# Patient Record
Sex: MALE | Race: WHITE | NOT HISPANIC OR LATINO | Employment: OTHER | URBAN - METROPOLITAN AREA
[De-identification: names, ages, dates, MRNs, and addresses within clinical notes are randomized per-mention and may not be internally consistent; named-entity substitution may affect disease eponyms.]

---

## 2024-01-31 ENCOUNTER — OFFICE VISIT (OUTPATIENT)
Dept: PRIMARY CARE | Facility: CLINIC | Age: 59
End: 2024-01-31
Payer: COMMERCIAL

## 2024-01-31 VITALS
OXYGEN SATURATION: 96 % | DIASTOLIC BLOOD PRESSURE: 78 MMHG | HEART RATE: 77 BPM | WEIGHT: 200.6 LBS | SYSTOLIC BLOOD PRESSURE: 127 MMHG | BODY MASS INDEX: 30.95 KG/M2

## 2024-01-31 DIAGNOSIS — L42 PITYRIASIS ROSEA: Primary | ICD-10-CM

## 2024-01-31 PROCEDURE — 99213 OFFICE O/P EST LOW 20 MIN: CPT

## 2024-01-31 ASSESSMENT — ENCOUNTER SYMPTOMS
FEVER: 0
DIARRHEA: 0
VOMITING: 0
FATIGUE: 0
NAUSEA: 0
CHILLS: 0

## 2024-01-31 ASSESSMENT — PATIENT HEALTH QUESTIONNAIRE - PHQ9
SUM OF ALL RESPONSES TO PHQ9 QUESTIONS 1 AND 2: 0
1. LITTLE INTEREST OR PLEASURE IN DOING THINGS: NOT AT ALL
2. FEELING DOWN, DEPRESSED OR HOPELESS: NOT AT ALL

## 2024-01-31 ASSESSMENT — PAIN SCALES - GENERAL: PAINLEVEL: 0-NO PAIN

## 2024-01-31 NOTE — PROGRESS NOTES
Subjective   Patient ID: Levy Flores is a 58 y.o. male who presents for Rash.    Rash  This is a new problem. The current episode started 1 to 4 weeks ago (about two weeks. Endorse may have started with intial patch on torso). The problem has been gradually worsening since onset. The affected locations include the abdomen, right arm, right upper leg, left upper leg, left arm, torso and back. The rash is characterized by redness and itchiness. He was exposed to a new detergent/soap, a new medication and a new animal. Pertinent negatives include no diarrhea, fatigue, fever or vomiting. (Did have slight cold that has resolved and started after rash was already present) Past treatments include antibiotic cream. The treatment provided no relief.        Review of Systems   Constitutional:  Negative for chills, fatigue and fever.   Gastrointestinal:  Negative for diarrhea, nausea and vomiting.   Skin:  Positive for rash.       Objective   /78   Pulse 77   Wt 91 kg (200 lb 9.6 oz)   SpO2 96%   BMI 30.95 kg/m²     Physical Exam  Constitutional:       Appearance: Normal appearance.   Cardiovascular:      Rate and Rhythm: Normal rate.   Pulmonary:      Effort: Pulmonary effort is normal.   Skin:     Findings: Rash present. Rash is scaling.      Comments: Erythematous plaque and papules throughout torso upper legs, and arms with scale   Neurological:      Mental Status: He is alert.   Psychiatric:         Mood and Affect: Mood and affect normal.         Assessment/Plan   Diagnoses and all orders for this visit:  Pityriasis rosea  -Educated benign and will resolve without treatment. Can use anti-histamine topical or oral for itch as needed. Given information packet on rash.

## 2024-03-18 ENCOUNTER — LAB (OUTPATIENT)
Dept: LAB | Facility: LAB | Age: 59
End: 2024-03-18
Payer: COMMERCIAL

## 2024-03-18 ENCOUNTER — OFFICE VISIT (OUTPATIENT)
Dept: PRIMARY CARE | Facility: CLINIC | Age: 59
End: 2024-03-18
Payer: COMMERCIAL

## 2024-03-18 ENCOUNTER — HOSPITAL ENCOUNTER (OUTPATIENT)
Dept: RADIOLOGY | Facility: CLINIC | Age: 59
Discharge: HOME | End: 2024-03-18
Payer: COMMERCIAL

## 2024-03-18 VITALS
SYSTOLIC BLOOD PRESSURE: 136 MMHG | WEIGHT: 202 LBS | HEART RATE: 72 BPM | DIASTOLIC BLOOD PRESSURE: 80 MMHG | BODY MASS INDEX: 29.92 KG/M2 | OXYGEN SATURATION: 95 % | HEIGHT: 69 IN

## 2024-03-18 DIAGNOSIS — Z13.220 LIPID SCREENING: ICD-10-CM

## 2024-03-18 DIAGNOSIS — Z12.5 SCREENING PSA (PROSTATE SPECIFIC ANTIGEN): ICD-10-CM

## 2024-03-18 DIAGNOSIS — Z23 NEED FOR VACCINATION: ICD-10-CM

## 2024-03-18 DIAGNOSIS — Z00.00 ROUTINE GENERAL MEDICAL EXAMINATION AT A HEALTH CARE FACILITY: Primary | ICD-10-CM

## 2024-03-18 DIAGNOSIS — Z00.00 ROUTINE GENERAL MEDICAL EXAMINATION AT A HEALTH CARE FACILITY: ICD-10-CM

## 2024-03-18 DIAGNOSIS — Z13.1 SCREENING FOR DIABETES MELLITUS: ICD-10-CM

## 2024-03-18 DIAGNOSIS — M25.471 RIGHT ANKLE SWELLING: ICD-10-CM

## 2024-03-18 LAB
ALBUMIN SERPL BCP-MCNC: 4.4 G/DL (ref 3.4–5)
ALP SERPL-CCNC: 43 U/L (ref 33–120)
ALT SERPL W P-5'-P-CCNC: 21 U/L (ref 10–52)
ANION GAP SERPL CALC-SCNC: 13 MMOL/L (ref 10–20)
AST SERPL W P-5'-P-CCNC: 18 U/L (ref 9–39)
BILIRUB SERPL-MCNC: 0.8 MG/DL (ref 0–1.2)
BUN SERPL-MCNC: 23 MG/DL (ref 6–23)
CALCIUM SERPL-MCNC: 9.1 MG/DL (ref 8.6–10.6)
CHLORIDE SERPL-SCNC: 109 MMOL/L (ref 98–107)
CHOLEST SERPL-MCNC: 156 MG/DL (ref 0–199)
CHOLESTEROL/HDL RATIO: 3.9
CO2 SERPL-SCNC: 25 MMOL/L (ref 21–32)
CREAT SERPL-MCNC: 0.95 MG/DL (ref 0.5–1.3)
EGFRCR SERPLBLD CKD-EPI 2021: >90 ML/MIN/1.73M*2
GLUCOSE SERPL-MCNC: 96 MG/DL (ref 74–99)
HDLC SERPL-MCNC: 39.7 MG/DL
LDLC SERPL CALC-MCNC: 97 MG/DL
NON HDL CHOLESTEROL: 116 MG/DL (ref 0–149)
POC APPEARANCE, URINE: CLEAR
POC BILIRUBIN, URINE: NEGATIVE
POC BLOOD, URINE: NEGATIVE
POC COLOR, URINE: YELLOW
POC GLUCOSE, URINE: NEGATIVE MG/DL
POC KETONES, URINE: NEGATIVE MG/DL
POC LEUKOCYTES, URINE: NEGATIVE
POC NITRITE,URINE: NEGATIVE
POC PH, URINE: 5.5 PH
POC PROTEIN, URINE: NEGATIVE MG/DL
POC SPECIFIC GRAVITY, URINE: >=1.03
POC UROBILINOGEN, URINE: 0.2 EU/DL
POTASSIUM SERPL-SCNC: 4.5 MMOL/L (ref 3.5–5.3)
PROT SERPL-MCNC: 6.9 G/DL (ref 6.4–8.2)
PSA SERPL-MCNC: 0.97 NG/ML
SODIUM SERPL-SCNC: 142 MMOL/L (ref 136–145)
TRIGL SERPL-MCNC: 97 MG/DL (ref 0–149)
VLDL: 19 MG/DL (ref 0–40)

## 2024-03-18 PROCEDURE — 90750 HZV VACC RECOMBINANT IM: CPT | Performed by: FAMILY MEDICINE

## 2024-03-18 PROCEDURE — 80053 COMPREHEN METABOLIC PANEL: CPT

## 2024-03-18 PROCEDURE — 80061 LIPID PANEL: CPT

## 2024-03-18 PROCEDURE — 99396 PREV VISIT EST AGE 40-64: CPT | Performed by: FAMILY MEDICINE

## 2024-03-18 PROCEDURE — 36415 COLL VENOUS BLD VENIPUNCTURE: CPT

## 2024-03-18 PROCEDURE — 1036F TOBACCO NON-USER: CPT | Performed by: FAMILY MEDICINE

## 2024-03-18 PROCEDURE — 73610 X-RAY EXAM OF ANKLE: CPT | Mod: RT

## 2024-03-18 PROCEDURE — 84153 ASSAY OF PSA TOTAL: CPT

## 2024-03-18 PROCEDURE — 81003 URINALYSIS AUTO W/O SCOPE: CPT | Mod: QW | Performed by: FAMILY MEDICINE

## 2024-03-18 RX ORDER — IBUPROFEN 800 MG/1
800 TABLET ORAL EVERY 6 HOURS PRN
COMMUNITY
Start: 2024-03-13

## 2024-03-18 ASSESSMENT — PATIENT HEALTH QUESTIONNAIRE - PHQ9
SUM OF ALL RESPONSES TO PHQ9 QUESTIONS 1 AND 2: 0
2. FEELING DOWN, DEPRESSED OR HOPELESS: NOT AT ALL
1. LITTLE INTEREST OR PLEASURE IN DOING THINGS: NOT AT ALL

## 2024-03-18 ASSESSMENT — ENCOUNTER SYMPTOMS
CARDIOVASCULAR NEGATIVE: 1
PSYCHIATRIC NEGATIVE: 1
MUSCULOSKELETAL NEGATIVE: 1
RESPIRATORY NEGATIVE: 1
HEMATOLOGIC/LYMPHATIC NEGATIVE: 1
ENDOCRINE NEGATIVE: 1
CONSTITUTIONAL NEGATIVE: 1
NEUROLOGICAL NEGATIVE: 1

## 2024-03-18 ASSESSMENT — PAIN SCALES - GENERAL: PAINLEVEL: 0-NO PAIN

## 2024-03-18 NOTE — PROGRESS NOTES
[facility]: [facility]  PHYSICAL EXAM      Levy Flores is a 59 y.o. male that is presenting today for Annual Exam (Patient is concerned with acid reflux bothering him during the night/dd).     Subjective   58 yo CM    PT here for annual exam    He does have some issues as heartburn.      Walks mile per day, exercise -4 x per week.      Colon 2016.      Review of Systems   Constitutional: Negative.    HENT: Negative.     Respiratory: Negative.     Cardiovascular: Negative.    Endocrine: Negative.    Genitourinary: Negative.    Musculoskeletal: Negative.    Neurological: Negative.    Hematological: Negative.    Psychiatric/Behavioral: Negative.         History    History reviewed. No pertinent past medical history.  History reviewed. No pertinent surgical history.  Family History   Problem Relation Name Age of Onset    Cancer Mother       Allergies   Allergen Reactions    Morphine Nausea/vomiting     Nausea and vomiting     Current Outpatient Medications on File Prior to Visit   Medication Sig Dispense Refill    ibuprofen 800 mg tablet Take 1 tablet (800 mg) by mouth every 6 hours if needed.       No current facility-administered medications on file prior to visit.     Immunization History   Administered Date(s) Administered    Juvencio SARS-CoV-2 Vaccination 03/18/2021    Moderna SARS-CoV-2 Vaccination 11/29/2021     Patient's medical history was reviewed and updated either before or during this encounter.    Objective   Vitals:    03/18/24 0806   BP: 136/80   Pulse: 72   SpO2: 95%      Physical Exam  Constitutional:       General: He is not in acute distress.     Appearance: Normal appearance. He is not ill-appearing.   HENT:      Right Ear: Tympanic membrane, ear canal and external ear normal. There is no impacted cerumen.      Left Ear: Tympanic membrane, ear canal and external ear normal.      Nose: Nose normal.      Mouth/Throat:      Pharynx: Oropharynx is clear. No posterior  oropharyngeal erythema.   Neck:      Thyroid: No thyroid mass or thyroid tenderness.      Vascular: No carotid bruit.   Cardiovascular:      Rate and Rhythm: Normal rate and regular rhythm.      Pulses:           Radial pulses are 2+ on the right side and 2+ on the left side.        Dorsalis pedis pulses are 2+ on the right side and 2+ on the left side.      Heart sounds: Normal heart sounds. No murmur heard.     No friction rub. No gallop.   Pulmonary:      Effort: Pulmonary effort is normal.      Breath sounds: Normal breath sounds.   Abdominal:      General: Bowel sounds are normal.      Palpations: Abdomen is soft. There is no hepatomegaly or splenomegaly.      Tenderness: There is no abdominal tenderness.   Musculoskeletal:      Cervical back: Normal range of motion. No tenderness.      Right lower leg: No edema.      Left lower leg: No edema.      Comments: No gross abnormalities    Lymphadenopathy:      Cervical: No cervical adenopathy.      Upper Body:      Right upper body: No supraclavicular adenopathy.      Left upper body: No supraclavicular adenopathy.   Skin:     General: Skin is warm.      Capillary Refill: Capillary refill takes 2 to 3 seconds.   Neurological:      General: No focal deficit present.      Mental Status: He is alert.      Cranial Nerves: Cranial nerves 2-12 are intact.      Coordination: Coordination is intact.      Gait: Gait is intact.      Comments: No obvious neurological deficits    Psychiatric:         Mood and Affect: Mood and affect normal.           Assessment/Plan      There is no problem list on file for this patient.    Diagnoses and all orders for this visit:  Routine general medical examination at a health care facility  -     POCT UA Automated manually resulted  -     Comprehensive Metabolic Panel; Future  -     Lipid Panel; Future  -     Prostate Specific Antigen, Screen; Future  Lipid screening  -     Comprehensive Metabolic Panel; Future  -     Lipid Panel;  Future  Screening for diabetes mellitus  -     Comprehensive Metabolic Panel; Future  -     Lipid Panel; Future  Screening PSA (prostate specific antigen)  -     Prostate Specific Antigen, Screen; Future  Right ankle swelling  -     XR ankle right 3+ views; Future  Need for vaccination  Other orders  -     Zoster vaccine, recombinant, adult (SHINGRIX)  Shingles vaccines.    Elevate bed 2-6 inches for nocturnal GERD    The patient was encouraged to ensure that any/all documentation is accurate and up to date, and that our office be provided a copy in the event that anything changes.    Radames Peralta MD

## 2024-06-17 ENCOUNTER — HOSPITAL ENCOUNTER (EMERGENCY)
Facility: HOSPITAL | Age: 59
Discharge: HOME | End: 2024-06-17
Attending: STUDENT IN AN ORGANIZED HEALTH CARE EDUCATION/TRAINING PROGRAM
Payer: COMMERCIAL

## 2024-06-17 ENCOUNTER — APPOINTMENT (OUTPATIENT)
Dept: RADIOLOGY | Facility: HOSPITAL | Age: 59
End: 2024-06-17
Payer: COMMERCIAL

## 2024-06-17 VITALS
HEIGHT: 69 IN | RESPIRATION RATE: 15 BRPM | TEMPERATURE: 98.4 F | WEIGHT: 177.25 LBS | HEART RATE: 88 BPM | SYSTOLIC BLOOD PRESSURE: 135 MMHG | BODY MASS INDEX: 26.25 KG/M2 | DIASTOLIC BLOOD PRESSURE: 80 MMHG | OXYGEN SATURATION: 96 %

## 2024-06-17 DIAGNOSIS — R11.2 NAUSEA AND VOMITING, UNSPECIFIED VOMITING TYPE: Primary | ICD-10-CM

## 2024-06-17 DIAGNOSIS — I10 HYPERTENSION, UNSPECIFIED TYPE: ICD-10-CM

## 2024-06-17 DIAGNOSIS — R10.84 GENERALIZED ABDOMINAL PAIN: ICD-10-CM

## 2024-06-17 DIAGNOSIS — D72.829 LEUKOCYTOSIS, UNSPECIFIED TYPE: ICD-10-CM

## 2024-06-17 DIAGNOSIS — F19.10 POLYSUBSTANCE ABUSE (MULTI): ICD-10-CM

## 2024-06-17 LAB
ALBUMIN SERPL-MCNC: 4.8 G/DL (ref 3.5–5)
ALP BLD-CCNC: 59 U/L (ref 35–125)
ALT SERPL-CCNC: 58 U/L (ref 5–40)
AMPHETAMINES UR QL SCN>1000 NG/ML: NEGATIVE
ANION GAP SERPL CALC-SCNC: 16 MMOL/L
APPEARANCE UR: CLEAR
AST SERPL-CCNC: 56 U/L (ref 5–40)
BARBITURATES UR QL SCN>300 NG/ML: NEGATIVE
BASOPHILS # BLD AUTO: 0.09 X10*3/UL (ref 0–0.1)
BASOPHILS NFR BLD AUTO: 0.5 %
BENZODIAZ UR QL SCN>300 NG/ML: NEGATIVE
BILIRUB SERPL-MCNC: 1.4 MG/DL (ref 0.1–1.2)
BILIRUB UR STRIP.AUTO-MCNC: NEGATIVE MG/DL
BUN SERPL-MCNC: 33 MG/DL (ref 8–25)
BZE UR QL SCN>300 NG/ML: NEGATIVE
CALCIUM SERPL-MCNC: 9.9 MG/DL (ref 8.5–10.4)
CANNABINOIDS UR QL SCN>50 NG/ML: POSITIVE
CHLORIDE SERPL-SCNC: 99 MMOL/L (ref 97–107)
CO2 SERPL-SCNC: 20 MMOL/L (ref 24–31)
COLOR UR: YELLOW
CREAT SERPL-MCNC: 1.3 MG/DL (ref 0.4–1.6)
EGFRCR SERPLBLD CKD-EPI 2021: 63 ML/MIN/1.73M*2
EOSINOPHIL # BLD AUTO: 0.01 X10*3/UL (ref 0–0.7)
EOSINOPHIL NFR BLD AUTO: 0.1 %
ERYTHROCYTE [DISTWIDTH] IN BLOOD BY AUTOMATED COUNT: 12.4 % (ref 11.5–14.5)
FENTANYL+NORFENTANYL UR QL SCN: NEGATIVE
GLUCOSE SERPL-MCNC: 129 MG/DL (ref 65–99)
GLUCOSE UR STRIP.AUTO-MCNC: NORMAL MG/DL
HCT VFR BLD AUTO: 47.3 % (ref 41–52)
HGB BLD-MCNC: 16.2 G/DL (ref 13.5–17.5)
IMM GRANULOCYTES # BLD AUTO: 0.05 X10*3/UL (ref 0–0.7)
IMM GRANULOCYTES NFR BLD AUTO: 0.3 % (ref 0–0.9)
KETONES UR STRIP.AUTO-MCNC: NEGATIVE MG/DL
LACTATE BLDV-SCNC: 1.8 MMOL/L (ref 0.4–2)
LACTATE BLDV-SCNC: 2.2 MMOL/L (ref 0.4–2)
LEUKOCYTE ESTERASE UR QL STRIP.AUTO: NEGATIVE
LIPASE SERPL-CCNC: 27 U/L (ref 16–63)
LYMPHOCYTES # BLD AUTO: 2.55 X10*3/UL (ref 1.2–4.8)
LYMPHOCYTES NFR BLD AUTO: 14 %
MAGNESIUM SERPL-MCNC: 2 MG/DL (ref 1.6–3.1)
MCH RBC QN AUTO: 28.8 PG (ref 26–34)
MCHC RBC AUTO-ENTMCNC: 34.2 G/DL (ref 32–36)
MCV RBC AUTO: 84 FL (ref 80–100)
METHADONE UR QL SCN>300 NG/ML: NEGATIVE
MONOCYTES # BLD AUTO: 1.43 X10*3/UL (ref 0.1–1)
MONOCYTES NFR BLD AUTO: 7.9 %
MUCOUS THREADS #/AREA URNS AUTO: NORMAL /LPF
NEUTROPHILS # BLD AUTO: 14.07 X10*3/UL (ref 1.2–7.7)
NEUTROPHILS NFR BLD AUTO: 77.2 %
NITRITE UR QL STRIP.AUTO: NEGATIVE
NRBC BLD-RTO: 0 /100 WBCS (ref 0–0)
OPIATES UR QL SCN>300 NG/ML: POSITIVE
OXYCODONE UR QL: NEGATIVE
PCP UR QL SCN>25 NG/ML: NEGATIVE
PH UR STRIP.AUTO: 6 [PH]
PLATELET # BLD AUTO: 337 X10*3/UL (ref 150–450)
POTASSIUM SERPL-SCNC: 4.1 MMOL/L (ref 3.4–5.1)
PROT SERPL-MCNC: 8.3 G/DL (ref 5.9–7.9)
PROT UR STRIP.AUTO-MCNC: ABNORMAL MG/DL
RBC # BLD AUTO: 5.63 X10*6/UL (ref 4.5–5.9)
RBC # UR STRIP.AUTO: ABNORMAL /UL
RBC #/AREA URNS AUTO: NORMAL /HPF
SODIUM SERPL-SCNC: 135 MMOL/L (ref 133–145)
SP GR UR STRIP.AUTO: 1.03
UROBILINOGEN UR STRIP.AUTO-MCNC: NORMAL MG/DL
WBC # BLD AUTO: 18.2 X10*3/UL (ref 4.4–11.3)
WBC #/AREA URNS AUTO: NORMAL /HPF

## 2024-06-17 PROCEDURE — 74177 CT ABD & PELVIS W/CONTRAST: CPT | Performed by: RADIOLOGY

## 2024-06-17 PROCEDURE — 2500000004 HC RX 250 GENERAL PHARMACY W/ HCPCS (ALT 636 FOR OP/ED): Performed by: STUDENT IN AN ORGANIZED HEALTH CARE EDUCATION/TRAINING PROGRAM

## 2024-06-17 PROCEDURE — 83605 ASSAY OF LACTIC ACID: CPT | Performed by: PHYSICIAN ASSISTANT

## 2024-06-17 PROCEDURE — 2500000002 HC RX 250 W HCPCS SELF ADMINISTERED DRUGS (ALT 637 FOR MEDICARE OP, ALT 636 FOR OP/ED): Performed by: PHYSICIAN ASSISTANT

## 2024-06-17 PROCEDURE — 83690 ASSAY OF LIPASE: CPT | Performed by: PHYSICIAN ASSISTANT

## 2024-06-17 PROCEDURE — 36415 COLL VENOUS BLD VENIPUNCTURE: CPT | Performed by: PHYSICIAN ASSISTANT

## 2024-06-17 PROCEDURE — 2550000001 HC RX 255 CONTRASTS: Performed by: STUDENT IN AN ORGANIZED HEALTH CARE EDUCATION/TRAINING PROGRAM

## 2024-06-17 PROCEDURE — 81001 URINALYSIS AUTO W/SCOPE: CPT | Mod: 59 | Performed by: PHYSICIAN ASSISTANT

## 2024-06-17 PROCEDURE — 80307 DRUG TEST PRSMV CHEM ANLYZR: CPT | Performed by: PHYSICIAN ASSISTANT

## 2024-06-17 PROCEDURE — 96361 HYDRATE IV INFUSION ADD-ON: CPT

## 2024-06-17 PROCEDURE — 99284 EMERGENCY DEPT VISIT MOD MDM: CPT

## 2024-06-17 PROCEDURE — 80053 COMPREHEN METABOLIC PANEL: CPT | Performed by: PHYSICIAN ASSISTANT

## 2024-06-17 PROCEDURE — 87040 BLOOD CULTURE FOR BACTERIA: CPT | Mod: 91,TRILAB | Performed by: PHYSICIAN ASSISTANT

## 2024-06-17 PROCEDURE — 2500000002 HC RX 250 W HCPCS SELF ADMINISTERED DRUGS (ALT 637 FOR MEDICARE OP, ALT 636 FOR OP/ED): Performed by: STUDENT IN AN ORGANIZED HEALTH CARE EDUCATION/TRAINING PROGRAM

## 2024-06-17 PROCEDURE — 96374 THER/PROPH/DIAG INJ IV PUSH: CPT | Mod: 59

## 2024-06-17 PROCEDURE — 83735 ASSAY OF MAGNESIUM: CPT | Performed by: PHYSICIAN ASSISTANT

## 2024-06-17 PROCEDURE — 85025 COMPLETE CBC W/AUTO DIFF WBC: CPT | Performed by: PHYSICIAN ASSISTANT

## 2024-06-17 PROCEDURE — 74177 CT ABD & PELVIS W/CONTRAST: CPT

## 2024-06-17 PROCEDURE — 96375 TX/PRO/DX INJ NEW DRUG ADDON: CPT

## 2024-06-17 PROCEDURE — 2500000004 HC RX 250 GENERAL PHARMACY W/ HCPCS (ALT 636 FOR OP/ED): Performed by: PHYSICIAN ASSISTANT

## 2024-06-17 RX ORDER — PROMETHAZINE HYDROCHLORIDE 25 MG/1
25 TABLET ORAL EVERY 6 HOURS PRN
Qty: 12 TABLET | Refills: 0 | Status: SHIPPED | OUTPATIENT
Start: 2024-06-17 | End: 2024-06-20

## 2024-06-17 RX ORDER — HALOPERIDOL 5 MG/ML
5 INJECTION INTRAMUSCULAR ONCE
Status: COMPLETED | OUTPATIENT
Start: 2024-06-17 | End: 2024-06-17

## 2024-06-17 RX ORDER — PROMETHAZINE HYDROCHLORIDE 25 MG/1
25 TABLET ORAL ONCE
Status: COMPLETED | OUTPATIENT
Start: 2024-06-17 | End: 2024-06-17

## 2024-06-17 RX ORDER — SUCRALFATE 1 G/10ML
1 SUSPENSION ORAL ONCE
Status: COMPLETED | OUTPATIENT
Start: 2024-06-17 | End: 2024-06-17

## 2024-06-17 RX ORDER — ONDANSETRON 4 MG/1
2 TABLET, ORALLY DISINTEGRATING ORAL EVERY 8 HOURS PRN
Qty: 15 TABLET | Refills: 0 | Status: SHIPPED | OUTPATIENT
Start: 2024-06-17 | End: 2024-06-17 | Stop reason: WASHOUT

## 2024-06-17 RX ORDER — SUCRALFATE 1 G/1
1 TABLET ORAL
Qty: 28 TABLET | Refills: 0 | Status: SHIPPED | OUTPATIENT
Start: 2024-06-17 | End: 2024-06-24

## 2024-06-17 RX ORDER — ONDANSETRON HYDROCHLORIDE 2 MG/ML
4 INJECTION, SOLUTION INTRAVENOUS ONCE
Status: COMPLETED | OUTPATIENT
Start: 2024-06-17 | End: 2024-06-17

## 2024-06-17 RX ORDER — FAMOTIDINE 10 MG/ML
20 INJECTION INTRAVENOUS ONCE
Status: COMPLETED | OUTPATIENT
Start: 2024-06-17 | End: 2024-06-17

## 2024-06-17 RX ORDER — DICYCLOMINE HYDROCHLORIDE 20 MG/1
20 TABLET ORAL 2 TIMES DAILY
Qty: 20 TABLET | Refills: 0 | Status: SHIPPED | OUTPATIENT
Start: 2024-06-17 | End: 2024-06-27

## 2024-06-17 ASSESSMENT — PAIN - FUNCTIONAL ASSESSMENT: PAIN_FUNCTIONAL_ASSESSMENT: 0-10

## 2024-06-17 ASSESSMENT — COLUMBIA-SUICIDE SEVERITY RATING SCALE - C-SSRS
2. HAVE YOU ACTUALLY HAD ANY THOUGHTS OF KILLING YOURSELF?: NO
6. HAVE YOU EVER DONE ANYTHING, STARTED TO DO ANYTHING, OR PREPARED TO DO ANYTHING TO END YOUR LIFE?: NO
1. IN THE PAST MONTH, HAVE YOU WISHED YOU WERE DEAD OR WISHED YOU COULD GO TO SLEEP AND NOT WAKE UP?: NO

## 2024-06-17 ASSESSMENT — PAIN SCALES - GENERAL: PAINLEVEL_OUTOF10: 8

## 2024-06-17 NOTE — ED PROVIDER NOTES
HPI   Chief Complaint   Patient presents with    Vomiting     Since Saturday I have been vomiting I have taken 15 hot baths because it makes me feel better       HPI  The patient 59-year-old male here for nausea and vomiting, has had issues on and off for quite some time with this, states that hot baths make him feel better.  He has indicated that he has been seen for this in the past but usually once or twice a year he has issues, he has indicated that this particular episode is been going on since Saturday.                  Manny Coma Scale Score: 15                     Patient History   No past medical history on file.  No past surgical history on file.  Family History   Problem Relation Name Age of Onset    Cancer Mother       Social History     Tobacco Use    Smoking status: Never    Smokeless tobacco: Never   Substance Use Topics    Alcohol use: Never    Drug use: Never       Physical Exam   ED Triage Vitals [06/17/24 1721]   Temperature Heart Rate Respirations BP   36.9 °C (98.4 °F) 85 18 145/80      Pulse Ox Temp Source Heart Rate Source Patient Position   98 % Oral Monitor --      BP Location FiO2 (%)     -- --       Physical Exam  PHYSICAL EXAMINATION    GENERAL APPEARANCE: Awake and alert.     VITAL SIGNS: As per the nurses' triage record.     HEENT: Normocephalic, atraumatic. Extraocular muscles are intact. Pupils equal round and reactive to light. Conjunctiva are pink. Negative scleral icterus. Mucous membranes are moist. Tongue in the midline. Pharynx was without erythema or exudates, uvula midline    NECK: Soft Nontender and supple, full gross ROM, no meningeal signs.    CHEST: Nontender to palpation. Clear to auscultation bilaterally. No rales, rhonchi, or wheezing.     HEART: S1, S2. Regular rate and rhythm. No murmurs, gallops or rubs.  Strong and equal pulses in the extremities.     ABDOMEN: Soft, generalized tenderness diffusely., nondistended, positive bowel sounds, no palpable  masses.    MUSCULOSKELETAL: The calves are nontender to palpation. Full gross active range of motion. Ambulating on own with no acute difficulties     NEUROLOGICAL: Awake, alert and oriented x 3. Power intact in the upper and lower extremities. Sensation is intact to light touch in the upper and lower extremities.     IMMUNOLOGICAL: No lymphatic streaking noted     DERM: No petechiae, rashes, or ecchymoses.  ED Course & MDM   Diagnoses as of 06/17/24 2122   Nausea and vomiting, unspecified vomiting type   Generalized abdominal pain   Leukocytosis, unspecified type   Polysubstance abuse (Multi)   Hypertension, unspecified type       Medical Decision Making  Parts of this chart have been completed using voice recognition software. Please excuse any errors of transcription.  My thought process and reason for plan has been formulated from the time that I saw the patient until the time of disposition and is not specific to one specific moment during their visit and furthermore my MDM encompasses this entire chart and not only this text box.      HPI: Detailed above.    Exam: A medically appropriate exam performed, outlined above, given the known history and presentation.    History Limited by: Nothing    History obtained from: The patient    External/internal records reviewed: No external records reviewed    Social Determinants of Health considered during this visit: Lives at home    Chronic conditions impacting care: Similar episodes in the past    Medications given during visit:  Medications   sodium chloride 0.9 % bolus 1,000 mL (0 mL intravenous Stopped 6/17/24 1808)   haloperidol lactate (Haldol) injection 5 mg (5 mg intravenous Given 6/17/24 1753)   sodium chloride 0.9 % bolus 1,000 mL (0 mL intravenous Stopped 6/17/24 1848)   iohexol (OMNIPaque) 350 mg iodine/mL solution 75 mL (75 mL intravenous Given 6/17/24 1904)   ondansetron (Zofran) injection 4 mg (4 mg intravenous Given 6/17/24 1915)   famotidine PF (Pepcid)  injection 20 mg (20 mg intravenous Given 6/17/24 2031)   sodium chloride 0.9 % bolus 1,000 mL (1,000 mL intravenous New Bag 6/17/24 2031)   sucralfate (Carafate) suspension 1 g (1 g oral Given 6/17/24 2031)   promethazine (Phenergan) tablet 25 mg (25 mg oral Given 6/17/24 2112)        Diagnostic/tests  Labs Reviewed   CBC WITH AUTO DIFFERENTIAL - Abnormal       Result Value    WBC 18.2 (*)     nRBC 0.0      RBC 5.63      Hemoglobin 16.2      Hematocrit 47.3      MCV 84      MCH 28.8      MCHC 34.2      RDW 12.4      Platelets 337      Neutrophils % 77.2      Immature Granulocytes %, Automated 0.3      Lymphocytes % 14.0      Monocytes % 7.9      Eosinophils % 0.1      Basophils % 0.5      Neutrophils Absolute 14.07 (*)     Immature Granulocytes Absolute, Automated 0.05      Lymphocytes Absolute 2.55      Monocytes Absolute 1.43 (*)     Eosinophils Absolute 0.01      Basophils Absolute 0.09     COMPREHENSIVE METABOLIC PANEL - Abnormal    Glucose 129 (*)     Sodium 135      Potassium 4.1      Chloride 99      Bicarbonate 20 (*)     Urea Nitrogen 33 (*)     Creatinine 1.30      eGFR 63      Calcium 9.9      Albumin 4.8      Alkaline Phosphatase 59      Total Protein 8.3 (*)     AST 56 (*)     Bilirubin, Total 1.4 (*)     ALT 58 (*)     Anion Gap 16     URINALYSIS WITH REFLEX CULTURE AND MICROSCOPIC - Abnormal    Color, Urine Yellow      Appearance, Urine Clear      Specific Gravity, Urine 1.029      pH, Urine 6.0      Protein, Urine 50 (1+) (*)     Glucose, Urine Normal      Blood, Urine 0.1 (1+) (*)     Ketones, Urine NEGATIVE      Bilirubin, Urine NEGATIVE      Urobilinogen, Urine Normal      Nitrite, Urine NEGATIVE      Leukocyte Esterase, Urine NEGATIVE     DRUG SCREEN,URINE - Abnormal    Amphetamine Screen, Urine Negative      Barbiturate Screen, Urine Negative      Benzodiazepines Screen, Urine Negative      Cannabinoid Screen, Urine Positive (*)     Cocaine Metabolite Screen, Urine Negative      Fentanyl Screen,  Urine Negative      Methadone Screen, Urine Negative      Opiate Screen, Urine Positive (*)     Oxycodone Screen, Urine Negative      PCP Screen, Urine Negative      Narrative:     These toxicological screening tests provide unconfirmed qualitative measurements to aid in treatment and diagnosis in cases of drug use or overdose. This test is used only for medical purposes. A positive result does not indicate or measure intoxication. For specific test performance or pathologist consultation, please contact the Laboratory.    The following threshold concentrations are used for these analyses.Values at or above the threshold concentration are reported as positive. Values below the threshold are reported as negative.    Drug /Screening Threshold                                                                                                 THC/CANNABINOIDS................50 ng/ml  METHADONE......................300 ng/ml  COCAINE METABOLITES............300 ng/ml  BENZODIAZEPINE.................300 ng/ml  PCP.............................25 ng/ml  OPIATE.........................300 ng/ml  AMPHETAMINE/ECSTASY...........1000 ng/ml  BARBITURATE....................200 ng/ml  OXYCODONE......................100 ng/ml  FENTANYL.........................5 ng/ml       BLOOD GAS LACTIC ACID, VENOUS - Abnormal    POCT Lactate, Venous 2.2 (*)    LIPASE - Normal    Lipase 27     MAGNESIUM - Normal    Magnesium 2.00     BLOOD GAS LACTIC ACID, VENOUS - Normal    POCT Lactate, Venous 1.8     BLOOD CULTURE   BLOOD CULTURE   URINALYSIS WITH REFLEX CULTURE AND MICROSCOPIC    Narrative:     The following orders were created for panel order Urinalysis with Reflex Culture and Microscopic.  Procedure                               Abnormality         Status                     ---------                               -----------         ------                     Urinalysis with Reflex C...[[id number]]  Abnormal            Final result                Extra Urine Gray Tube[[id number]]                                                         Please view results for these tests on the individual orders.   EXTRA URINE GRAY TUBE   URINALYSIS MICROSCOPIC WITH REFLEX CULTURE    WBC, Urine 1-5      RBC, Urine 1-2      Mucus, Urine 1+        CT abdomen pelvis w IV contrast   Final Result   No acute abdominal or pelvic process.                  MACRO:   None        Signed by: Yue Todd 6/17/2024 8:06 PM   Dictation workstation:   [id number]           Case discussed with: Tending physician    Prescription medications considered: Symptomatic management will be given with Carafate, Bentyl, Zofran    Considerations/further MDM:   I estimate there is low risk for acute abdomen.      I have considered the following: acute appendicitis, bowel obstruction, cholecystitis, diverticulitis, incarcerated hernia, mesenteric ischemia, pancreatitis, acute liver failure, renal failure, UTI/Pyelonephritis to an extent that requires admission and IV abx, perforated bowel, or ulcers.        Thus I consider the discharge disposition reasonable. Also, there is no evidence or peritonitis, sepsis, or toxicity. We have discussed the diagnosis and risks, and we agree with discharging home to follow-up with appropriate physician as directed. We also discussed returning to the Emergency Department immediately if new or worsening symptoms occur. We have discussed the symptoms which are most concerning (e.g., bloody stool, fever, changing or worsening pain, nausea, vomiting) that necessitate immediate return.     Pt symptoms have been moderately well controlled here with medications and the patient is lower risk for acute/surgical abdomen and is safe for discharge with appropriate outpatient follow up.  The patient has verbalized understanding to return to ER without delay for new or worsening pains or for any other symptoms or concerns.    Recommend also follow-up to PCP for reassessment of  blood pressure as it was slightly elevated during ER visit.  Also recommended discontinuation of any recreational drug use  Procedure  Procedures     Al De La Fuente PA-C  06/17/24 4535

## 2024-06-18 NOTE — DISCHARGE INSTRUCTIONS
Be sure to follow up as directed in 1-2 days.  All of the details of your follow up instructions are detailed in the follow up section of this packet.     I recommend that you discontinue any and all recreational drug use as it may be contributing to your symptoms, follow-up on an outpatient basis he may return back to the emergency department anytime should you develop any different worsening pains or symptoms      It is important to remember that your care does not end here and you must continue to monitor your condition closely. Please return to the emergency department for any worsening or concerning signs or symptoms as directed by our conversations and the discharge instructions. Otherwise please follow up with your doctor in 2 days if no better or worse. If you do not have a doctor please contact the referral number on your discharge instructions. Please contact any physician specialists provided in your discharge notes as it is very important to follow up with them regarding your condition. If you are unable to reach the physicians provided, please come back to the Emergency Department at any time.        Return to emergency room without delay for ANY new or worsening pains or for any other symptoms or concerns.

## 2024-06-21 LAB
BACTERIA BLD CULT: NORMAL
BACTERIA BLD CULT: NORMAL

## 2024-07-19 ENCOUNTER — LAB (OUTPATIENT)
Dept: LAB | Facility: LAB | Age: 59
End: 2024-07-19
Payer: COMMERCIAL

## 2024-07-19 DIAGNOSIS — R11.2 NAUSEA WITH VOMITING, UNSPECIFIED: Primary | ICD-10-CM

## 2024-07-19 PROCEDURE — 84443 ASSAY THYROID STIM HORMONE: CPT

## 2024-07-19 PROCEDURE — 36415 COLL VENOUS BLD VENIPUNCTURE: CPT

## 2024-07-20 LAB — TSH SERPL-ACNC: 1.07 MIU/L (ref 0.44–3.98)

## 2024-07-22 ENCOUNTER — LAB (OUTPATIENT)
Dept: LAB | Facility: LAB | Age: 59
End: 2024-07-22
Payer: COMMERCIAL

## 2024-07-22 DIAGNOSIS — R94.5 ABNORMAL RESULTS OF LIVER FUNCTION STUDIES: Primary | ICD-10-CM

## 2024-07-22 LAB
ALBUMIN SERPL BCP-MCNC: 4.3 G/DL (ref 3.4–5)
ALP SERPL-CCNC: 39 U/L (ref 33–120)
ALT SERPL W P-5'-P-CCNC: 44 U/L (ref 10–52)
AST SERPL W P-5'-P-CCNC: 21 U/L (ref 9–39)
BILIRUB DIRECT SERPL-MCNC: 0.2 MG/DL (ref 0–0.3)
BILIRUB SERPL-MCNC: 1.2 MG/DL (ref 0–1.2)
HBV SURFACE AB SER-ACNC: <3.1 MIU/ML
HBV SURFACE AG SERPL QL IA: NONREACTIVE
HCV AB SER QL: NONREACTIVE
PROT SERPL-MCNC: 6.9 G/DL (ref 6.4–8.2)

## 2024-07-22 PROCEDURE — 80076 HEPATIC FUNCTION PANEL: CPT

## 2024-07-22 PROCEDURE — 86706 HEP B SURFACE ANTIBODY: CPT

## 2024-07-22 PROCEDURE — 86803 HEPATITIS C AB TEST: CPT

## 2024-07-22 PROCEDURE — 87340 HEPATITIS B SURFACE AG IA: CPT

## 2024-07-22 PROCEDURE — 36415 COLL VENOUS BLD VENIPUNCTURE: CPT

## 2024-07-24 DIAGNOSIS — R94.5 ABNORMAL RESULTS OF LIVER FUNCTION STUDIES: Primary | ICD-10-CM

## 2024-08-05 ENCOUNTER — OFFICE VISIT (OUTPATIENT)
Dept: PRIMARY CARE | Facility: CLINIC | Age: 59
End: 2024-08-05
Payer: COMMERCIAL

## 2024-08-05 DIAGNOSIS — Z23 NEED FOR VACCINATION: Primary | ICD-10-CM

## 2024-08-05 PROCEDURE — 90750 HZV VACC RECOMBINANT IM: CPT | Performed by: FAMILY MEDICINE

## 2024-08-05 PROCEDURE — 90471 IMMUNIZATION ADMIN: CPT | Performed by: FAMILY MEDICINE

## 2025-02-10 ENCOUNTER — APPOINTMENT (OUTPATIENT)
Dept: RADIOLOGY | Facility: HOSPITAL | Age: 60
End: 2025-02-10
Payer: COMMERCIAL

## 2025-02-10 ENCOUNTER — HOSPITAL ENCOUNTER (EMERGENCY)
Facility: HOSPITAL | Age: 60
Discharge: HOME | End: 2025-02-10
Attending: STUDENT IN AN ORGANIZED HEALTH CARE EDUCATION/TRAINING PROGRAM
Payer: COMMERCIAL

## 2025-02-10 ENCOUNTER — APPOINTMENT (OUTPATIENT)
Dept: CARDIOLOGY | Facility: HOSPITAL | Age: 60
End: 2025-02-10
Payer: COMMERCIAL

## 2025-02-10 VITALS
RESPIRATION RATE: 18 BRPM | SYSTOLIC BLOOD PRESSURE: 165 MMHG | BODY MASS INDEX: 29.14 KG/M2 | WEIGHT: 192.24 LBS | DIASTOLIC BLOOD PRESSURE: 84 MMHG | HEIGHT: 68 IN | TEMPERATURE: 98.1 F | OXYGEN SATURATION: 98 % | HEART RATE: 77 BPM

## 2025-02-10 DIAGNOSIS — R79.89 LFTS ABNORMAL: ICD-10-CM

## 2025-02-10 DIAGNOSIS — R11.2 NAUSEA AND VOMITING, UNSPECIFIED VOMITING TYPE: ICD-10-CM

## 2025-02-10 DIAGNOSIS — R10.84 GENERALIZED ABDOMINAL PAIN: Primary | ICD-10-CM

## 2025-02-10 DIAGNOSIS — R79.89 ELEVATED TROPONIN: ICD-10-CM

## 2025-02-10 LAB
ALBUMIN SERPL BCP-MCNC: 5.1 G/DL (ref 3.4–5)
ALP SERPL-CCNC: 44 U/L (ref 33–120)
ALT SERPL W P-5'-P-CCNC: 71 U/L (ref 10–52)
ANION GAP SERPL CALCULATED.3IONS-SCNC: 15 MMOL/L (ref 10–20)
AST SERPL W P-5'-P-CCNC: 151 U/L (ref 9–39)
ATRIAL RATE: 100 BPM
BASOPHILS # BLD AUTO: 0.04 X10*3/UL (ref 0–0.1)
BASOPHILS NFR BLD AUTO: 0.2 %
BILIRUB SERPL-MCNC: 2.5 MG/DL (ref 0–1.2)
BUN SERPL-MCNC: 33 MG/DL (ref 6–23)
CALCIUM SERPL-MCNC: 9.5 MG/DL (ref 8.6–10.3)
CARDIAC TROPONIN I PNL SERPL HS: 31 NG/L (ref 0–20)
CARDIAC TROPONIN I PNL SERPL HS: 38 NG/L (ref 0–20)
CHLORIDE SERPL-SCNC: 100 MMOL/L (ref 98–107)
CO2 SERPL-SCNC: 25 MMOL/L (ref 21–32)
CREAT SERPL-MCNC: 1.32 MG/DL (ref 0.5–1.3)
EGFRCR SERPLBLD CKD-EPI 2021: 62 ML/MIN/1.73M*2
EOSINOPHIL # BLD AUTO: 0.02 X10*3/UL (ref 0–0.7)
EOSINOPHIL NFR BLD AUTO: 0.1 %
ERYTHROCYTE [DISTWIDTH] IN BLOOD BY AUTOMATED COUNT: 12.4 % (ref 11.5–14.5)
FLUAV RNA RESP QL NAA+PROBE: NOT DETECTED
FLUBV RNA RESP QL NAA+PROBE: NOT DETECTED
GLUCOSE SERPL-MCNC: 129 MG/DL (ref 74–99)
HCT VFR BLD AUTO: 47.1 % (ref 41–52)
HGB BLD-MCNC: 16.2 G/DL (ref 13.5–17.5)
IMM GRANULOCYTES # BLD AUTO: 0.08 X10*3/UL (ref 0–0.7)
IMM GRANULOCYTES NFR BLD AUTO: 0.5 % (ref 0–0.9)
LACTATE SERPL-SCNC: 1.2 MMOL/L (ref 0.4–2)
LIPASE SERPL-CCNC: 24 U/L (ref 9–82)
LYMPHOCYTES # BLD AUTO: 2.5 X10*3/UL (ref 1.2–4.8)
LYMPHOCYTES NFR BLD AUTO: 14.2 %
MCH RBC QN AUTO: 29.1 PG (ref 26–34)
MCHC RBC AUTO-ENTMCNC: 34.4 G/DL (ref 32–36)
MCV RBC AUTO: 85 FL (ref 80–100)
MONOCYTES # BLD AUTO: 1.61 X10*3/UL (ref 0.1–1)
MONOCYTES NFR BLD AUTO: 9.2 %
NEUTROPHILS # BLD AUTO: 13.31 X10*3/UL (ref 1.2–7.7)
NEUTROPHILS NFR BLD AUTO: 75.8 %
NRBC BLD-RTO: 0 /100 WBCS (ref 0–0)
P AXIS: 57 DEGREES
P OFFSET: 207 MS
P ONSET: 154 MS
PLATELET # BLD AUTO: 334 X10*3/UL (ref 150–450)
POTASSIUM SERPL-SCNC: 3.8 MMOL/L (ref 3.5–5.3)
PR INTERVAL: 140 MS
PROT SERPL-MCNC: 8.5 G/DL (ref 6.4–8.2)
Q ONSET: 224 MS
QRS COUNT: 16 BEATS
QRS DURATION: 84 MS
QT INTERVAL: 336 MS
QTC CALCULATION(BAZETT): 433 MS
QTC FREDERICIA: 398 MS
R AXIS: -4 DEGREES
RBC # BLD AUTO: 5.57 X10*6/UL (ref 4.5–5.9)
SARS-COV-2 RNA RESP QL NAA+PROBE: NOT DETECTED
SODIUM SERPL-SCNC: 136 MMOL/L (ref 136–145)
T AXIS: 48 DEGREES
T OFFSET: 392 MS
VENTRICULAR RATE: 100 BPM
WBC # BLD AUTO: 17.6 X10*3/UL (ref 4.4–11.3)

## 2025-02-10 PROCEDURE — 2500000005 HC RX 250 GENERAL PHARMACY W/O HCPCS: Performed by: STUDENT IN AN ORGANIZED HEALTH CARE EDUCATION/TRAINING PROGRAM

## 2025-02-10 PROCEDURE — 36415 COLL VENOUS BLD VENIPUNCTURE: CPT | Performed by: PHYSICIAN ASSISTANT

## 2025-02-10 PROCEDURE — 87040 BLOOD CULTURE FOR BACTERIA: CPT | Mod: TRILAB | Performed by: STUDENT IN AN ORGANIZED HEALTH CARE EDUCATION/TRAINING PROGRAM

## 2025-02-10 PROCEDURE — 2550000001 HC RX 255 CONTRASTS: Performed by: STUDENT IN AN ORGANIZED HEALTH CARE EDUCATION/TRAINING PROGRAM

## 2025-02-10 PROCEDURE — 96374 THER/PROPH/DIAG INJ IV PUSH: CPT

## 2025-02-10 PROCEDURE — 71046 X-RAY EXAM CHEST 2 VIEWS: CPT

## 2025-02-10 PROCEDURE — 80053 COMPREHEN METABOLIC PANEL: CPT | Performed by: STUDENT IN AN ORGANIZED HEALTH CARE EDUCATION/TRAINING PROGRAM

## 2025-02-10 PROCEDURE — 85025 COMPLETE CBC W/AUTO DIFF WBC: CPT | Performed by: PHYSICIAN ASSISTANT

## 2025-02-10 PROCEDURE — 93005 ELECTROCARDIOGRAM TRACING: CPT

## 2025-02-10 PROCEDURE — 36415 COLL VENOUS BLD VENIPUNCTURE: CPT | Performed by: STUDENT IN AN ORGANIZED HEALTH CARE EDUCATION/TRAINING PROGRAM

## 2025-02-10 PROCEDURE — 83605 ASSAY OF LACTIC ACID: CPT | Performed by: STUDENT IN AN ORGANIZED HEALTH CARE EDUCATION/TRAINING PROGRAM

## 2025-02-10 PROCEDURE — 84484 ASSAY OF TROPONIN QUANT: CPT | Performed by: STUDENT IN AN ORGANIZED HEALTH CARE EDUCATION/TRAINING PROGRAM

## 2025-02-10 PROCEDURE — 85025 COMPLETE CBC W/AUTO DIFF WBC: CPT | Performed by: STUDENT IN AN ORGANIZED HEALTH CARE EDUCATION/TRAINING PROGRAM

## 2025-02-10 PROCEDURE — 96375 TX/PRO/DX INJ NEW DRUG ADDON: CPT

## 2025-02-10 PROCEDURE — 2500000004 HC RX 250 GENERAL PHARMACY W/ HCPCS (ALT 636 FOR OP/ED): Performed by: PHYSICIAN ASSISTANT

## 2025-02-10 PROCEDURE — 99285 EMERGENCY DEPT VISIT HI MDM: CPT | Mod: 25 | Performed by: STUDENT IN AN ORGANIZED HEALTH CARE EDUCATION/TRAINING PROGRAM

## 2025-02-10 PROCEDURE — 71046 X-RAY EXAM CHEST 2 VIEWS: CPT | Mod: FOREIGN READ | Performed by: RADIOLOGY

## 2025-02-10 PROCEDURE — 87636 SARSCOV2 & INF A&B AMP PRB: CPT | Performed by: PHYSICIAN ASSISTANT

## 2025-02-10 PROCEDURE — 83690 ASSAY OF LIPASE: CPT | Performed by: STUDENT IN AN ORGANIZED HEALTH CARE EDUCATION/TRAINING PROGRAM

## 2025-02-10 PROCEDURE — 2500000001 HC RX 250 WO HCPCS SELF ADMINISTERED DRUGS (ALT 637 FOR MEDICARE OP): Performed by: STUDENT IN AN ORGANIZED HEALTH CARE EDUCATION/TRAINING PROGRAM

## 2025-02-10 PROCEDURE — 74177 CT ABD & PELVIS W/CONTRAST: CPT | Mod: FOREIGN READ | Performed by: RADIOLOGY

## 2025-02-10 PROCEDURE — 74177 CT ABD & PELVIS W/CONTRAST: CPT

## 2025-02-10 PROCEDURE — 96361 HYDRATE IV INFUSION ADD-ON: CPT

## 2025-02-10 RX ORDER — ALUMINUM HYDROXIDE, MAGNESIUM HYDROXIDE, AND SIMETHICONE 1200; 120; 1200 MG/30ML; MG/30ML; MG/30ML
30 SUSPENSION ORAL ONCE
Status: COMPLETED | OUTPATIENT
Start: 2025-02-10 | End: 2025-02-10

## 2025-02-10 RX ORDER — ONDANSETRON HYDROCHLORIDE 2 MG/ML
4 INJECTION, SOLUTION INTRAVENOUS ONCE
Status: COMPLETED | OUTPATIENT
Start: 2025-02-10 | End: 2025-02-10

## 2025-02-10 RX ORDER — ONDANSETRON 4 MG/1
4 TABLET, ORALLY DISINTEGRATING ORAL EVERY 8 HOURS PRN
Qty: 20 TABLET | Refills: 0 | Status: SHIPPED | OUTPATIENT
Start: 2025-02-10 | End: 2025-02-17

## 2025-02-10 RX ORDER — HALOPERIDOL LACTATE 5 MG/ML
2 INJECTION, SOLUTION INTRAMUSCULAR ONCE
Status: COMPLETED | OUTPATIENT
Start: 2025-02-10 | End: 2025-02-10

## 2025-02-10 RX ORDER — LIDOCAINE HYDROCHLORIDE 20 MG/ML
15 SOLUTION OROPHARYNGEAL ONCE
Status: COMPLETED | OUTPATIENT
Start: 2025-02-10 | End: 2025-02-10

## 2025-02-10 RX ORDER — FAMOTIDINE 20 MG/1
20 TABLET, FILM COATED ORAL 2 TIMES DAILY
Qty: 30 TABLET | Refills: 0 | Status: SHIPPED | OUTPATIENT
Start: 2025-02-10 | End: 2025-02-11 | Stop reason: ALTCHOICE

## 2025-02-10 RX ADMIN — LIDOCAINE HYDROCHLORIDE 15 ML: 20 SOLUTION ORAL at 18:00

## 2025-02-10 RX ADMIN — ALUMINUM HYDROXIDE, MAGNESIUM HYDROXIDE, AND SIMETHICONE 30 ML: 200; 200; 20 SUSPENSION ORAL at 18:00

## 2025-02-10 RX ADMIN — ONDANSETRON 4 MG: 2 INJECTION, SOLUTION INTRAMUSCULAR; INTRAVENOUS at 14:58

## 2025-02-10 RX ADMIN — IOHEXOL 75 ML: 350 INJECTION, SOLUTION INTRAVENOUS at 16:50

## 2025-02-10 RX ADMIN — SODIUM CHLORIDE 500 ML: 900 INJECTION, SOLUTION INTRAVENOUS at 14:58

## 2025-02-10 RX ADMIN — HALOPERIDOL LACTATE 2 MG: 5 INJECTION, SOLUTION INTRAMUSCULAR at 16:02

## 2025-02-10 RX ADMIN — HYDROMORPHONE HYDROCHLORIDE 0.5 MG: 1 INJECTION, SOLUTION INTRAMUSCULAR; INTRAVENOUS; SUBCUTANEOUS at 14:58

## 2025-02-10 ASSESSMENT — PAIN DESCRIPTION - ORIENTATION: ORIENTATION: MID

## 2025-02-10 ASSESSMENT — PAIN DESCRIPTION - DESCRIPTORS
DESCRIPTORS: BURNING
DESCRIPTORS: BURNING

## 2025-02-10 ASSESSMENT — PAIN SCALES - GENERAL
PAINLEVEL_OUTOF10: 2
PAINLEVEL_OUTOF10: 7

## 2025-02-10 ASSESSMENT — COLUMBIA-SUICIDE SEVERITY RATING SCALE - C-SSRS
2. HAVE YOU ACTUALLY HAD ANY THOUGHTS OF KILLING YOURSELF?: NO
1. IN THE PAST MONTH, HAVE YOU WISHED YOU WERE DEAD OR WISHED YOU COULD GO TO SLEEP AND NOT WAKE UP?: NO
6. HAVE YOU EVER DONE ANYTHING, STARTED TO DO ANYTHING, OR PREPARED TO DO ANYTHING TO END YOUR LIFE?: NO

## 2025-02-10 ASSESSMENT — PAIN DESCRIPTION - FREQUENCY: FREQUENCY: CONSTANT/CONTINUOUS

## 2025-02-10 ASSESSMENT — PAIN DESCRIPTION - LOCATION: LOCATION: ABDOMEN

## 2025-02-10 ASSESSMENT — PAIN - FUNCTIONAL ASSESSMENT
PAIN_FUNCTIONAL_ASSESSMENT: 0-10
PAIN_FUNCTIONAL_ASSESSMENT: 0-10

## 2025-02-10 NOTE — ED NOTES
"Pt notified of need for UA and CT Scan at this time.   Pt states he is unable to give urine and states \"Im just too sick\". Pt refused straight cath at this time.     Per CT Tech pt refusing scan. Will continue to monitor.      Emely Kaur RN  02/10/25 [id number]    "

## 2025-02-11 ENCOUNTER — HOSPITAL ENCOUNTER (EMERGENCY)
Facility: HOSPITAL | Age: 60
Discharge: HOME | End: 2025-02-11
Attending: STUDENT IN AN ORGANIZED HEALTH CARE EDUCATION/TRAINING PROGRAM
Payer: COMMERCIAL

## 2025-02-11 VITALS
BODY MASS INDEX: 28.47 KG/M2 | DIASTOLIC BLOOD PRESSURE: 82 MMHG | SYSTOLIC BLOOD PRESSURE: 127 MMHG | TEMPERATURE: 98.6 F | HEART RATE: 82 BPM | RESPIRATION RATE: 17 BRPM | HEIGHT: 68 IN | OXYGEN SATURATION: 97 % | WEIGHT: 187.83 LBS

## 2025-02-11 DIAGNOSIS — R11.2 NAUSEA AND VOMITING, UNSPECIFIED VOMITING TYPE: ICD-10-CM

## 2025-02-11 DIAGNOSIS — R10.13 EPIGASTRIC PAIN: Primary | ICD-10-CM

## 2025-02-11 LAB
ALBUMIN SERPL BCP-MCNC: 4.8 G/DL (ref 3.4–5)
ALP SERPL-CCNC: 42 U/L (ref 33–120)
ALT SERPL W P-5'-P-CCNC: 92 U/L (ref 10–52)
ANION GAP SERPL CALCULATED.3IONS-SCNC: 11 MMOL/L (ref 10–20)
AST SERPL W P-5'-P-CCNC: 140 U/L (ref 9–39)
BASOPHILS # BLD AUTO: 0.05 X10*3/UL (ref 0–0.1)
BASOPHILS NFR BLD AUTO: 0.3 %
BILIRUB DIRECT SERPL-MCNC: 0.3 MG/DL (ref 0–0.3)
BILIRUB SERPL-MCNC: 2.2 MG/DL (ref 0–1.2)
BUN SERPL-MCNC: 27 MG/DL (ref 6–23)
CALCIUM SERPL-MCNC: 9.3 MG/DL (ref 8.6–10.3)
CHLORIDE SERPL-SCNC: 101 MMOL/L (ref 98–107)
CO2 SERPL-SCNC: 26 MMOL/L (ref 21–32)
CREAT SERPL-MCNC: 1.18 MG/DL (ref 0.5–1.3)
EGFRCR SERPLBLD CKD-EPI 2021: 71 ML/MIN/1.73M*2
EOSINOPHIL # BLD AUTO: 0.01 X10*3/UL (ref 0–0.7)
EOSINOPHIL NFR BLD AUTO: 0.1 %
ERYTHROCYTE [DISTWIDTH] IN BLOOD BY AUTOMATED COUNT: 12.2 % (ref 11.5–14.5)
GLUCOSE SERPL-MCNC: 128 MG/DL (ref 74–99)
HCT VFR BLD AUTO: 44.9 % (ref 41–52)
HGB BLD-MCNC: 15.7 G/DL (ref 13.5–17.5)
IMM GRANULOCYTES # BLD AUTO: 0.11 X10*3/UL (ref 0–0.7)
IMM GRANULOCYTES NFR BLD AUTO: 0.6 % (ref 0–0.9)
LACTATE SERPL-SCNC: 0.9 MMOL/L (ref 0.4–2)
LIPASE SERPL-CCNC: 29 U/L (ref 9–82)
LYMPHOCYTES # BLD AUTO: 2.38 X10*3/UL (ref 1.2–4.8)
LYMPHOCYTES NFR BLD AUTO: 13.3 %
MCH RBC QN AUTO: 29.4 PG (ref 26–34)
MCHC RBC AUTO-ENTMCNC: 35 G/DL (ref 32–36)
MCV RBC AUTO: 84 FL (ref 80–100)
MONOCYTES # BLD AUTO: 2.02 X10*3/UL (ref 0.1–1)
MONOCYTES NFR BLD AUTO: 11.3 %
NEUTROPHILS # BLD AUTO: 13.3 X10*3/UL (ref 1.2–7.7)
NEUTROPHILS NFR BLD AUTO: 74.4 %
NRBC BLD-RTO: 0 /100 WBCS (ref 0–0)
PLATELET # BLD AUTO: 330 X10*3/UL (ref 150–450)
POTASSIUM SERPL-SCNC: 3.7 MMOL/L (ref 3.5–5.3)
PROT SERPL-MCNC: 7.8 G/DL (ref 6.4–8.2)
RBC # BLD AUTO: 5.34 X10*6/UL (ref 4.5–5.9)
SODIUM SERPL-SCNC: 134 MMOL/L (ref 136–145)
WBC # BLD AUTO: 17.9 X10*3/UL (ref 4.4–11.3)

## 2025-02-11 PROCEDURE — 2500000002 HC RX 250 W HCPCS SELF ADMINISTERED DRUGS (ALT 637 FOR MEDICARE OP, ALT 636 FOR OP/ED): Performed by: STUDENT IN AN ORGANIZED HEALTH CARE EDUCATION/TRAINING PROGRAM

## 2025-02-11 PROCEDURE — 2500000004 HC RX 250 GENERAL PHARMACY W/ HCPCS (ALT 636 FOR OP/ED): Performed by: STUDENT IN AN ORGANIZED HEALTH CARE EDUCATION/TRAINING PROGRAM

## 2025-02-11 PROCEDURE — 80053 COMPREHEN METABOLIC PANEL: CPT | Performed by: STUDENT IN AN ORGANIZED HEALTH CARE EDUCATION/TRAINING PROGRAM

## 2025-02-11 PROCEDURE — 83605 ASSAY OF LACTIC ACID: CPT | Performed by: STUDENT IN AN ORGANIZED HEALTH CARE EDUCATION/TRAINING PROGRAM

## 2025-02-11 PROCEDURE — 96375 TX/PRO/DX INJ NEW DRUG ADDON: CPT

## 2025-02-11 PROCEDURE — 82248 BILIRUBIN DIRECT: CPT | Performed by: STUDENT IN AN ORGANIZED HEALTH CARE EDUCATION/TRAINING PROGRAM

## 2025-02-11 PROCEDURE — 99284 EMERGENCY DEPT VISIT MOD MDM: CPT | Mod: 25 | Performed by: STUDENT IN AN ORGANIZED HEALTH CARE EDUCATION/TRAINING PROGRAM

## 2025-02-11 PROCEDURE — 85025 COMPLETE CBC W/AUTO DIFF WBC: CPT | Performed by: STUDENT IN AN ORGANIZED HEALTH CARE EDUCATION/TRAINING PROGRAM

## 2025-02-11 PROCEDURE — 36415 COLL VENOUS BLD VENIPUNCTURE: CPT | Performed by: STUDENT IN AN ORGANIZED HEALTH CARE EDUCATION/TRAINING PROGRAM

## 2025-02-11 PROCEDURE — 83690 ASSAY OF LIPASE: CPT | Performed by: STUDENT IN AN ORGANIZED HEALTH CARE EDUCATION/TRAINING PROGRAM

## 2025-02-11 PROCEDURE — 96361 HYDRATE IV INFUSION ADD-ON: CPT

## 2025-02-11 PROCEDURE — 96374 THER/PROPH/DIAG INJ IV PUSH: CPT

## 2025-02-11 RX ORDER — SUCRALFATE 1 G/10ML
1 SUSPENSION ORAL 4 TIMES DAILY PRN
Qty: 1200 ML | Refills: 0 | Status: SHIPPED | OUTPATIENT
Start: 2025-02-11 | End: 2025-03-13

## 2025-02-11 RX ORDER — HALOPERIDOL 5 MG/ML
2 INJECTION INTRAMUSCULAR ONCE
Status: COMPLETED | OUTPATIENT
Start: 2025-02-11 | End: 2025-02-11

## 2025-02-11 RX ORDER — FAMOTIDINE 10 MG/ML
20 INJECTION INTRAVENOUS ONCE
Status: COMPLETED | OUTPATIENT
Start: 2025-02-11 | End: 2025-02-11

## 2025-02-11 RX ORDER — OMEPRAZOLE 20 MG/1
20 CAPSULE, DELAYED RELEASE ORAL DAILY
Qty: 30 CAPSULE | Refills: 0 | Status: SHIPPED | OUTPATIENT
Start: 2025-02-11 | End: 2025-03-13

## 2025-02-11 RX ORDER — SUCRALFATE 1 G/10ML
1 SUSPENSION ORAL ONCE
Status: COMPLETED | OUTPATIENT
Start: 2025-02-11 | End: 2025-02-11

## 2025-02-11 RX ORDER — ONDANSETRON HYDROCHLORIDE 2 MG/ML
4 INJECTION, SOLUTION INTRAVENOUS ONCE
Status: COMPLETED | OUTPATIENT
Start: 2025-02-11 | End: 2025-02-11

## 2025-02-11 RX ADMIN — ONDANSETRON 4 MG: 2 INJECTION, SOLUTION INTRAMUSCULAR; INTRAVENOUS at 07:30

## 2025-02-11 RX ADMIN — SODIUM CHLORIDE 1000 ML: 900 INJECTION, SOLUTION INTRAVENOUS at 07:30

## 2025-02-11 RX ADMIN — FAMOTIDINE 20 MG: 10 INJECTION, SOLUTION INTRAVENOUS at 07:30

## 2025-02-11 RX ADMIN — HALOPERIDOL LACTATE 2 MG: 5 INJECTION, SOLUTION INTRAMUSCULAR at 08:10

## 2025-02-11 RX ADMIN — SUCRALFATE 1 G: 1 SUSPENSION ORAL at 08:12

## 2025-02-11 ASSESSMENT — COLUMBIA-SUICIDE SEVERITY RATING SCALE - C-SSRS
6. HAVE YOU EVER DONE ANYTHING, STARTED TO DO ANYTHING, OR PREPARED TO DO ANYTHING TO END YOUR LIFE?: NO
1. IN THE PAST MONTH, HAVE YOU WISHED YOU WERE DEAD OR WISHED YOU COULD GO TO SLEEP AND NOT WAKE UP?: NO
2. HAVE YOU ACTUALLY HAD ANY THOUGHTS OF KILLING YOURSELF?: NO

## 2025-02-11 ASSESSMENT — PAIN SCALES - GENERAL: PAINLEVEL_OUTOF10: 0 - NO PAIN

## 2025-02-11 ASSESSMENT — PAIN - FUNCTIONAL ASSESSMENT: PAIN_FUNCTIONAL_ASSESSMENT: 0-10

## 2025-02-11 NOTE — ED PROVIDER NOTES
HPI   Chief Complaint   Patient presents with    Abdominal Pain     N/v and diarrhea since Saturday morning. 7/10 burning pain in the mid abdomen, unable to hold fluids down. Fever of 101 on Saturday. A-febrile currently.        59-year-old male presented emergency department with a chief complaint of nausea, vomiting, generalized abdominal discomfort.  He is a marijuana user.  He states he will have episodes like this every so often but always has elevated white blood cell count with nothing objectively abnormal.  He is requesting an antiemetic.  He denies chest pain, shortness of breath, dysuria diarrhea.  Well-appearing nontoxic afebrile.  Denies fall injury or trauma.  Denies illicit substance use.  No other complaint.              Patient History   No past medical history on file.  No past surgical history on file.  Family History   Problem Relation Name Age of Onset    Cancer Mother       Social History     Tobacco Use    Smoking status: Never    Smokeless tobacco: Never   Substance Use Topics    Alcohol use: Never    Drug use: Never       Physical Exam   ED Triage Vitals [02/10/25 1356]   Temperature Heart Rate Respirations BP   36.7 °C (98.1 °F) (!) 107 16 (!) 177/112      Pulse Ox Temp Source Heart Rate Source Patient Position   96 % Temporal Monitor Sitting      BP Location FiO2 (%)     Right arm --       Physical Exam  Vitals and nursing note reviewed.   Constitutional:       Appearance: He is well-developed.   HENT:      Head: Normocephalic and atraumatic.   Cardiovascular:      Rate and Rhythm: Normal rate and regular rhythm.   Pulmonary:      Effort: Pulmonary effort is normal.      Breath sounds: Normal breath sounds.   Abdominal:      Comments: Diffuse abdominal discomfort   Skin:     General: Skin is warm.   Neurological:      General: No focal deficit present.      Mental Status: He is alert and oriented to person, place, and time.   Psychiatric:         Mood and Affect: Mood normal.           ED  Course & MDM   ED Course as of 02/10/25 1958   Mon Feb 10, 2025   1757 EKG on my interpretation shows normal sinus rhythm, rate of 100 beats minute.  Left axis deviation.  QTc 433 ms, AR interval 140.  No ST elevation or depression, no acute ischemic pattern.  No STEMI.  Poor R wave progression, nonspecific EKG. [NT]      ED Course User Index  [NT] Deniz Nguyen DO         Diagnoses as of 02/10/25 1958   Generalized abdominal pain   Nausea and vomiting, unspecified vomiting type   LFTs abnormal   Elevated troponin                 No data recorded     [place] Coma Scale Score: 15 (02/10/25 1443 : Emely Kaur RN)                           Medical Decision Making  I have seen and evaluated this patient.  The attending physician has also seen and evaluated this patient.  Vital signs, laboratory testing and diagnostic images if applicable have been reviewed.  All laboratory and imaging is interpreted by myself unless otherwise stated.  Radiology studies are also formally interpreted by radiologist.     Patient with 70,000 leukocytosis likely reactive.  No significant anemia metabolic panel without sheela renal impairment or electrolyte abnormality, patient with elevation of bilirubin and transaminitis.  He states he is at this with previous exacerbations of acute nausea vomiting.  His troponin was 38 down trended to 31.  Otherwise acutely nonischemic EKG with patient has no chest pain or shortness of breath.  He feels markedly improved in the emergency department after medication.  He will be discharged for outpatient primary follow-up in addition to gastroenterology follow-up for recheck of transaminases to ensure downtrend.  Released in stable condition from emergent standpoint with antiemetic.    Labs Reviewed  CBC WITH AUTO DIFFERENTIAL - Abnormal     WBC                           17.6 (*)               nRBC                          0.0                    RBC                           5.57                    Hemoglobin                    16.2                   Hematocrit                    47.1                   MCV                           85                     MCH                           29.1                   MCHC                          34.4                   RDW                           12.4                   Platelets                     334                    Neutrophils %                 75.8                   Immature Granulocytes %, Automated   0.5                    Lymphocytes %                 14.2                   Monocytes %                   9.2                    Eosinophils %                 0.1                    Basophils %                   0.2                    Neutrophils Absolute          13.31 (*)               Immature Granulocytes Absolute, Au*   0.08                   Lymphocytes Absolute          2.50                   Monocytes Absolute            1.61 (*)               Eosinophils Absolute          0.02                   Basophils Absolute            0.04                COMPREHENSIVE METABOLIC PANEL - Abnormal     Glucose                       129 (*)                Sodium                        136                    Potassium                     3.8                    Chloride                      100                    Bicarbonate                   25                     Anion Gap                     15                     Urea Nitrogen                 33 (*)                 Creatinine                    1.32 (*)               eGFR                          62                     Calcium                       9.5                    Albumin                       5.1 (*)                Alkaline Phosphatase          44                     Total Protein                 8.5 (*)                AST                           151 (*)                Bilirubin, Total              2.5 (*)                ALT                           71 (*)              TROPONIN I, HIGH SENSITIVITY -  Abnormal     Troponin I, High Sensitivity   38 (*)                   Narrative: Less than 99th percentile of normal range cutoff-                Female and children under 18 years old <14 ng/L; Male <21 ng/L: Negative                Repeat testing should be performed if clinically indicated.                                 Female and children under 18 years old 14-50 ng/L; Male 21-50 ng/L:                Consistent with possible cardiac damage and possible increased clinical                 risk. Serial measurements may help to assess extent of myocardial damage.                                 >50 ng/L: Consistent with cardiac damage, increased clinical risk and                myocardial infarction. Serial measurements may help assess extent of                 myocardial damage.                                  NOTE: Children less than 1 year old may have higher baseline troponin                 levels and results should be interpreted in conjunction with the overall                 clinical context.                                 NOTE: Troponin I testing is performed using a different                 testing methodology at [facility] than at other                 [facility]. Direct result comparisons should only                 be made within the same method.  TROPONIN I, HIGH SENSITIVITY - Abnormal     Troponin I, High Sensitivity   31 (*)                   Narrative: Less than 99th percentile of normal range cutoff-                Female and children under 18 years old <14 ng/L; Male <21 ng/L: Negative                Repeat testing should be performed if clinically indicated.                                 Female and children under 18 years old 14-50 ng/L; Male 21-50 ng/L:                Consistent with possible cardiac damage and possible increased clinical                 risk. Serial measurements may help to assess extent of myocardial damage.                                 >50 ng/L:  Consistent with cardiac damage, increased clinical risk and                myocardial infarction. Serial measurements may help assess extent of                 myocardial damage.                                  NOTE: Children less than 1 year old may have higher baseline troponin                 levels and results should be interpreted in conjunction with the overall                 clinical context.                                 NOTE: Troponin I testing is performed using a different                 testing methodology at [facility] than at other                 [facility]. Direct result comparisons should only                 be made within the same method.  LIPASE - Normal     Lipase                        24                       Narrative: Venipuncture immediately after or during the administration of Metamizole may lead to falsely low results. Testing should be performed immediately prior to Metamizole dosing.  SARS-COV-2 AND INFLUENZA A/B PCR - Normal     Flu A Result                                         Flu B Result                                         Coronavirus 2019, PCR                                  Narrative: This assay is an FDA-cleared, in vitro diagnostic nucleic acid amplification test for the qualitative detection and differentiation of SARS CoV-2/ Influenza A/B from nasopharyngeal specimens collected from individuals with signs and symptoms of respiratory tract infections, and has been validated for use at [facility]. Negative results do not preclude COVID-19/ Influenza A/B infections and should not be used as the sole basis for diagnosis, treatment, or other management decisions. Testing for SARS CoV-2 is recommended only for patients who meet current clinical and/or epidemiological criteria defined by federal, state, or local public health directives.  LACTATE - Normal     Lactate                       1.2                      Narrative:  Venipuncture immediately after or during the administration of Metamizole may lead to falsely low results. Testing should be performed immediately prior to Metamizole dosing.  BLOOD CULTURE  BLOOD CULTURE  URINALYSIS WITH REFLEX CULTURE AND MICROSCOPIC       Narrative: The following orders were created for panel order Urinalysis with Reflex Culture and Microscopic.                Procedure                               Abnormality         Status                                   ---------                               -----------         ------                                   Urinalysis with Reflex C...[[id number]]                                                               Extra Urine Gray Tube[[id number]]                                                                                     Please view results for these tests on the individual orders.  URINALYSIS WITH REFLEX CULTURE AND MICROSCOPIC  EXTRA URINE GRAY TUBE  CT abdomen pelvis w IV contrast   Final Result    No acute abnormality of the abdomen or pelvis.    3 mm nonobstructing stone in the right kidney.    Signed by Tee Hawkins MD     XR chest 2 views   Final Result    No acute cardiopulmonary disease.    Signed by Fernando Coreas MD     Medications  HYDROmorphone (Dilaudid) injection 0.5 mg (0.5 mg intravenous Given 2/10/25 7228)  ondansetron (Zofran) injection 4 mg (4 mg intravenous Given 2/10/25 1458)  sodium chloride 0.9 % bolus 500 mL (0 mL intravenous Stopped 2/10/25 1553)  haloperidol lactate (Haldol) injection 2 mg (2 mg intravenous Given 2/10/25 1602)  iohexol (OMNIPaque) 350 mg iodine/mL solution 75 mL (75 mL intravenous Given 2/10/25 1650)  alum-mag hydroxide-simeth (Mylanta) 200-200-20 mg/5 mL oral suspension 30 mL (30 mL oral Given 2/10/25 1800)  lidocaine (Xylocaine) 2 % mouth solution 15 mL (15 mL oral Given 2/10/25 1800)  Discharge Medication List as of 2/10/2025  7:58 PM    START taking these medications    famotidine (Pepcid) 20 mg  tablet  Take 1 tablet (20 mg) by mouth 2 times a day for 15 days., Starting Mon 2/10/2025, Until Tue 2/25/2025, Normal    ondansetron ODT (Zofran-ODT) 4 mg disintegrating tablet  Dissolve 1 tablet (4 mg) in the mouth every 8 hours if needed for nausea or vomiting for up to 7 days., Starting Mon 2/10/2025, Until Mon 2/17/2025 at 2359, Normal                  Procedure  Procedures     Tee Nguyen PA-C  02/10/25 6570

## 2025-02-11 NOTE — DISCHARGE INSTRUCTIONS
Follow-up with gastroenterology by calling their office today to schedule close follow-up appointment.  If symptoms worsen or change he can return at any time for further evaluation and treatment.

## 2025-02-11 NOTE — ED PROVIDER NOTES
HPI   Chief Complaint   Patient presents with    Vomiting     Pt complains of vomiting since Saturday.       Patient is a 59-year-old male who presents emergency room for evaluation of epigastric burning, nausea and vomiting.  Patient states symptoms for started 3 days ago.  It came on suddenly with some epigastric discomfort and burning sensation and has had persistent vomiting since that time.  He notes he was seen in the emergency room yesterday and had a workup which did show mildly elevated liver enzymes, slight leukocytosis however imaging was unremarkable and there was no obvious source.  Patient feeling better at time of discharge yesterday however states after drinking and eating some food at home yesterday evening he started having nausea and vomiting again.  He states he did not fill the medications prescribed to him yesterday.  Patient is concerned as he has had ulcerations and perforations in the past.  He denies fever, chills, chest pain, shortness of breath, cough or congestion.      History provided by:  Patient          Patient History   History reviewed. No pertinent past medical history.  History reviewed. No pertinent surgical history.  Family History   Problem Relation Name Age of Onset    Cancer Mother       Social History     Tobacco Use    Smoking status: Never    Smokeless tobacco: Never   Substance Use Topics    Alcohol use: Never    Drug use: Never       Physical Exam   ED Triage Vitals [02/11/25 0711]   Temperature Heart Rate Respirations BP   37 °C (98.6 °F) (!) 104 18 (!) 158/114      Pulse Ox Temp src Heart Rate Source Patient Position   97 % -- -- --      BP Location FiO2 (%)     -- --       Physical Exam  Vitals and nursing note reviewed.   Constitutional:       General: He is not in acute distress.     Appearance: Normal appearance. He is not ill-appearing.   HENT:      Head: Normocephalic and atraumatic.      Mouth/Throat:      Mouth: Mucous membranes are moist.   Eyes:       Extraocular Movements: Extraocular movements intact.      Pupils: Pupils are equal, round, and reactive to light.   Cardiovascular:      Rate and Rhythm: Regular rhythm. Tachycardia present.      Pulses: Normal pulses.   Pulmonary:      Effort: Pulmonary effort is normal. No respiratory distress.      Breath sounds: Normal breath sounds. No wheezing or rhonchi.   Abdominal:      General: Abdomen is flat.      Tenderness: There is abdominal tenderness (Mild epigastric tenderness palpation). There is no guarding or rebound.   Musculoskeletal:      Right lower leg: No edema.      Left lower leg: No edema.   Skin:     General: Skin is warm and dry.   Neurological:      General: No focal deficit present.      Mental Status: He is alert.       Recent Results (from the past 10 hours)   CBC and Auto Differential    Collection Time: 02/11/25  7:29 AM   Result Value Ref Range    WBC 17.9 (H) 4.4 - 11.3 x10*3/uL    nRBC 0.0 0.0 - 0.0 /100 WBCs    RBC 5.34 4.50 - 5.90 x10*6/uL    Hemoglobin 15.7 13.5 - 17.5 g/dL    Hematocrit 44.9 41.0 - 52.0 %    MCV 84 80 - 100 fL    MCH 29.4 26.0 - 34.0 pg    MCHC 35.0 32.0 - 36.0 g/dL    RDW 12.2 11.5 - 14.5 %    Platelets 330 150 - 450 x10*3/uL    Neutrophils % 74.4 40.0 - 80.0 %    Immature Granulocytes %, Automated 0.6 0.0 - 0.9 %    Lymphocytes % 13.3 13.0 - 44.0 %    Monocytes % 11.3 2.0 - 10.0 %    Eosinophils % 0.1 0.0 - 6.0 %    Basophils % 0.3 0.0 - 2.0 %    Neutrophils Absolute 13.30 (H) 1.20 - 7.70 x10*3/uL    Immature Granulocytes Absolute, Automated 0.11 0.00 - 0.70 x10*3/uL    Lymphocytes Absolute 2.38 1.20 - 4.80 x10*3/uL    Monocytes Absolute 2.02 (H) 0.10 - 1.00 x10*3/uL    Eosinophils Absolute 0.01 0.00 - 0.70 x10*3/uL    Basophils Absolute 0.05 0.00 - 0.10 x10*3/uL   Lipase    Collection Time: 02/11/25  7:29 AM   Result Value Ref Range    Lipase 29 9 - 82 U/L   Basic metabolic panel    Collection Time: 02/11/25  7:29 AM   Result Value Ref Range    Glucose 128 (H) 74 - 99  mg/dL    Sodium 134 (L) 136 - 145 mmol/L    Potassium 3.7 3.5 - 5.3 mmol/L    Chloride 101 98 - 107 mmol/L    Bicarbonate 26 21 - 32 mmol/L    Anion Gap 11 10 - 20 mmol/L    Urea Nitrogen 27 (H) 6 - 23 mg/dL    Creatinine 1.18 0.50 - 1.30 mg/dL    eGFR 71 >60 mL/min/1.73m*2    Calcium 9.3 8.6 - 10.3 mg/dL   Hepatic function panel    Collection Time: 02/11/25  7:29 AM   Result Value Ref Range    Albumin 4.8 3.4 - 5.0 g/dL    Bilirubin, Total 2.2 (H) 0.0 - 1.2 mg/dL    Bilirubin, Direct 0.3 0.0 - 0.3 mg/dL    Alkaline Phosphatase 42 33 - 120 U/L    ALT 92 (H) 10 - 52 U/L     (H) 9 - 39 U/L    Total Protein 7.8 6.4 - 8.2 g/dL   Lactate    Collection Time: 02/11/25  7:40 AM   Result Value Ref Range    Lactate 0.9 0.4 - 2.0 mmol/L         ED Course & MDM   Diagnoses as of 02/11/25 1452   Epigastric pain   Nausea and vomiting, unspecified vomiting type                 No data recorded     [place] Coma Scale Score: 15 (02/11/25 0719 : Jennifer Escobar RN)                           Medical Decision Making  Patient is a 59-year-old male who presents emergency department for evaluation of nausea, vomiting epigastric discomfort.  Patient uncomfortable appearing on exam and is mildly tachycardic however otherwise hemodynamically stable.  He does have mild epigastric tenderness palpation however no rigidity or guarding.  Patient did have gallbladder removed previously and I have low suspicion for retained gallstone given patient's negative CT scan yesterday.  Repeat blood work ordered including CBC, BMP, lipase, lactic acid.  Patient will be treated symptomatically at this time with IV fluids, IV famotidine, IV Zofran.  Blood work was unremarkable including normal lactic at 0.9, normal lipase 29.  Patient does have mildly elevated liver enzymes however it is similar to the patient's bladder from yesterday and imaging was negative at that time.  White count remains unchanged at 17.9 which again likely reactive to patient's  nausea and vomiting.  The patient was given IV fluids, IV Zofran, IV famotidine, IV Haldol as well as p.o. Carafate with significant improvement of symptoms.  I do strongly suspect patient has esophagitis versus possible ulcer at this time.  Physical exam is benign and patient able to tolerate p.o. intake at this time.  I did strongly recommend he follow-up with gastroenterology and he does already have a gastroenterologist he is established with.  Patient discharged on p.o. omeprazole, p.o. Carafate and instructed to use Zofran as needed which was sent to his pharmacy yesterday.  Return precautions were discussed with patient.        Procedure  Procedures     Tee Randle,   02/11/25 2858

## 2025-02-15 LAB
BACTERIA BLD CULT: NORMAL
BACTERIA BLD CULT: NORMAL

## 2025-05-26 ENCOUNTER — APPOINTMENT (OUTPATIENT)
Dept: RADIOLOGY | Facility: HOSPITAL | Age: 60
End: 2025-05-26
Payer: COMMERCIAL

## 2025-05-26 ENCOUNTER — HOSPITAL ENCOUNTER (OUTPATIENT)
Facility: HOSPITAL | Age: 60
Setting detail: OBSERVATION
LOS: 1 days | Discharge: HOME | End: 2025-05-27
Attending: EMERGENCY MEDICINE | Admitting: STUDENT IN AN ORGANIZED HEALTH CARE EDUCATION/TRAINING PROGRAM
Payer: COMMERCIAL

## 2025-05-26 DIAGNOSIS — N17.9 AKI (ACUTE KIDNEY INJURY): Primary | ICD-10-CM

## 2025-05-26 DIAGNOSIS — R11.2 NAUSEA AND VOMITING, UNSPECIFIED VOMITING TYPE: ICD-10-CM

## 2025-05-26 LAB
ALBUMIN SERPL BCP-MCNC: 5.4 G/DL (ref 3.4–5)
ALP SERPL-CCNC: 50 U/L (ref 33–136)
ALT SERPL W P-5'-P-CCNC: 19 U/L (ref 10–52)
ANION GAP SERPL CALCULATED.3IONS-SCNC: 21 MMOL/L (ref 10–20)
AST SERPL W P-5'-P-CCNC: 22 U/L (ref 9–39)
BASOPHILS # BLD AUTO: 0.04 X10*3/UL (ref 0–0.1)
BASOPHILS NFR BLD AUTO: 0.3 %
BILIRUB SERPL-MCNC: 0.9 MG/DL (ref 0–1.2)
BUN SERPL-MCNC: 33 MG/DL (ref 6–23)
CALCIUM SERPL-MCNC: 10.2 MG/DL (ref 8.6–10.3)
CHLORIDE SERPL-SCNC: 105 MMOL/L (ref 98–107)
CO2 SERPL-SCNC: 20 MMOL/L (ref 21–32)
CREAT SERPL-MCNC: 1.92 MG/DL (ref 0.5–1.3)
EGFRCR SERPLBLD CKD-EPI 2021: 39 ML/MIN/1.73M*2
EOSINOPHIL # BLD AUTO: 0 X10*3/UL (ref 0–0.7)
EOSINOPHIL NFR BLD AUTO: 0 %
ERYTHROCYTE [DISTWIDTH] IN BLOOD BY AUTOMATED COUNT: 12.4 % (ref 11.5–14.5)
GLUCOSE SERPL-MCNC: 224 MG/DL (ref 74–99)
HCT VFR BLD AUTO: 49.6 % (ref 41–52)
HGB BLD-MCNC: 16.8 G/DL (ref 13.5–17.5)
IMM GRANULOCYTES # BLD AUTO: 0.09 X10*3/UL (ref 0–0.7)
IMM GRANULOCYTES NFR BLD AUTO: 0.6 % (ref 0–0.9)
LIPASE SERPL-CCNC: 8 U/L (ref 9–82)
LYMPHOCYTES # BLD AUTO: 1.07 X10*3/UL (ref 1.2–4.8)
LYMPHOCYTES NFR BLD AUTO: 7.1 %
MCH RBC QN AUTO: 28.9 PG (ref 26–34)
MCHC RBC AUTO-ENTMCNC: 33.9 G/DL (ref 32–36)
MCV RBC AUTO: 85 FL (ref 80–100)
MONOCYTES # BLD AUTO: 0.56 X10*3/UL (ref 0.1–1)
MONOCYTES NFR BLD AUTO: 3.7 %
NEUTROPHILS # BLD AUTO: 13.3 X10*3/UL (ref 1.2–7.7)
NEUTROPHILS NFR BLD AUTO: 88.3 %
NRBC BLD-RTO: 0 /100 WBCS (ref 0–0)
PLATELET # BLD AUTO: 382 X10*3/UL (ref 150–450)
POTASSIUM SERPL-SCNC: 4.6 MMOL/L (ref 3.5–5.3)
PROT SERPL-MCNC: 8.9 G/DL (ref 6.4–8.2)
RBC # BLD AUTO: 5.82 X10*6/UL (ref 4.5–5.9)
SODIUM SERPL-SCNC: 141 MMOL/L (ref 136–145)
WBC # BLD AUTO: 15.1 X10*3/UL (ref 4.4–11.3)

## 2025-05-26 PROCEDURE — 99285 EMERGENCY DEPT VISIT HI MDM: CPT | Mod: 25 | Performed by: EMERGENCY MEDICINE

## 2025-05-26 PROCEDURE — 96361 HYDRATE IV INFUSION ADD-ON: CPT | Mod: 59

## 2025-05-26 PROCEDURE — 99223 1ST HOSP IP/OBS HIGH 75: CPT | Performed by: STUDENT IN AN ORGANIZED HEALTH CARE EDUCATION/TRAINING PROGRAM

## 2025-05-26 PROCEDURE — 2500000004 HC RX 250 GENERAL PHARMACY W/ HCPCS (ALT 636 FOR OP/ED): Performed by: EMERGENCY MEDICINE

## 2025-05-26 PROCEDURE — 1200000002 HC GENERAL ROOM WITH TELEMETRY DAILY

## 2025-05-26 PROCEDURE — 36415 COLL VENOUS BLD VENIPUNCTURE: CPT | Performed by: EMERGENCY MEDICINE

## 2025-05-26 PROCEDURE — 2500000001 HC RX 250 WO HCPCS SELF ADMINISTERED DRUGS (ALT 637 FOR MEDICARE OP): Performed by: EMERGENCY MEDICINE

## 2025-05-26 PROCEDURE — 74176 CT ABD & PELVIS W/O CONTRAST: CPT

## 2025-05-26 PROCEDURE — 96374 THER/PROPH/DIAG INJ IV PUSH: CPT | Mod: 59

## 2025-05-26 PROCEDURE — 2500000005 HC RX 250 GENERAL PHARMACY W/O HCPCS: Performed by: EMERGENCY MEDICINE

## 2025-05-26 PROCEDURE — 83690 ASSAY OF LIPASE: CPT | Performed by: EMERGENCY MEDICINE

## 2025-05-26 PROCEDURE — 71045 X-RAY EXAM CHEST 1 VIEW: CPT | Performed by: STUDENT IN AN ORGANIZED HEALTH CARE EDUCATION/TRAINING PROGRAM

## 2025-05-26 PROCEDURE — 96375 TX/PRO/DX INJ NEW DRUG ADDON: CPT | Mod: 59

## 2025-05-26 PROCEDURE — 80053 COMPREHEN METABOLIC PANEL: CPT | Performed by: EMERGENCY MEDICINE

## 2025-05-26 PROCEDURE — 71045 X-RAY EXAM CHEST 1 VIEW: CPT

## 2025-05-26 PROCEDURE — 74176 CT ABD & PELVIS W/O CONTRAST: CPT | Performed by: RADIOLOGY

## 2025-05-26 PROCEDURE — 85025 COMPLETE CBC W/AUTO DIFF WBC: CPT | Performed by: EMERGENCY MEDICINE

## 2025-05-26 RX ORDER — ONDANSETRON HYDROCHLORIDE 2 MG/ML
4 INJECTION, SOLUTION INTRAVENOUS ONCE
Status: COMPLETED | OUTPATIENT
Start: 2025-05-26 | End: 2025-05-26

## 2025-05-26 RX ORDER — HYDROMORPHONE HYDROCHLORIDE 1 MG/ML
1 INJECTION, SOLUTION INTRAMUSCULAR; INTRAVENOUS; SUBCUTANEOUS ONCE
Status: COMPLETED | OUTPATIENT
Start: 2025-05-26 | End: 2025-05-26

## 2025-05-26 RX ORDER — FAMOTIDINE 10 MG/ML
20 INJECTION, SOLUTION INTRAVENOUS ONCE
Status: COMPLETED | OUTPATIENT
Start: 2025-05-26 | End: 2025-05-27

## 2025-05-26 RX ORDER — LIDOCAINE HYDROCHLORIDE 20 MG/ML
15 SOLUTION OROPHARYNGEAL ONCE
Status: COMPLETED | OUTPATIENT
Start: 2025-05-26 | End: 2025-05-26

## 2025-05-26 RX ORDER — DIAZEPAM 5 MG/ML
5 INJECTION, SOLUTION INTRAMUSCULAR; INTRAVENOUS ONCE
Status: COMPLETED | OUTPATIENT
Start: 2025-05-26 | End: 2025-05-27

## 2025-05-26 RX ORDER — ALUMINUM HYDROXIDE, MAGNESIUM HYDROXIDE, AND SIMETHICONE 1200; 120; 1200 MG/30ML; MG/30ML; MG/30ML
30 SUSPENSION ORAL ONCE
Status: COMPLETED | OUTPATIENT
Start: 2025-05-26 | End: 2025-05-26

## 2025-05-26 RX ORDER — FENTANYL CITRATE 50 UG/ML
100 INJECTION, SOLUTION INTRAMUSCULAR; INTRAVENOUS ONCE
Status: COMPLETED | OUTPATIENT
Start: 2025-05-26 | End: 2025-05-26

## 2025-05-26 RX ADMIN — ALUMINUM HYDROXIDE, MAGNESIUM HYDROXIDE, AND SIMETHICONE 30 ML: 200; 200; 20 SUSPENSION ORAL at 22:34

## 2025-05-26 RX ADMIN — SODIUM CHLORIDE 1000 ML: 900 INJECTION, SOLUTION INTRAVENOUS at 21:15

## 2025-05-26 RX ADMIN — HYDROMORPHONE HYDROCHLORIDE 1 MG: 1 INJECTION, SOLUTION INTRAMUSCULAR; INTRAVENOUS; SUBCUTANEOUS at 22:25

## 2025-05-26 RX ADMIN — FENTANYL CITRATE 100 MCG: 50 INJECTION INTRAMUSCULAR; INTRAVENOUS at 21:17

## 2025-05-26 RX ADMIN — ONDANSETRON 4 MG: 2 INJECTION, SOLUTION INTRAMUSCULAR; INTRAVENOUS at 21:15

## 2025-05-26 RX ADMIN — DIAZEPAM 5 MG: 10 INJECTION, SOLUTION INTRAMUSCULAR; INTRAVENOUS at 23:59

## 2025-05-26 RX ADMIN — LIDOCAINE HYDROCHLORIDE 15 ML: 20 SOLUTION ORAL at 22:34

## 2025-05-26 SDOH — SOCIAL STABILITY: SOCIAL INSECURITY: WERE YOU ABLE TO COMPLETE ALL THE BEHAVIORAL HEALTH SCREENINGS?: YES

## 2025-05-26 SDOH — SOCIAL STABILITY: SOCIAL INSECURITY: HAVE YOU HAD THOUGHTS OF HARMING ANYONE ELSE?: NO

## 2025-05-26 ASSESSMENT — PATIENT HEALTH QUESTIONNAIRE - PHQ9
SUM OF ALL RESPONSES TO PHQ9 QUESTIONS 1 & 2: 0
2. FEELING DOWN, DEPRESSED OR HOPELESS: NOT AT ALL
1. LITTLE INTEREST OR PLEASURE IN DOING THINGS: NOT AT ALL

## 2025-05-26 ASSESSMENT — PAIN DESCRIPTION - ONSET: ONSET: SUDDEN

## 2025-05-26 ASSESSMENT — COLUMBIA-SUICIDE SEVERITY RATING SCALE - C-SSRS
1. IN THE PAST MONTH, HAVE YOU WISHED YOU WERE DEAD OR WISHED YOU COULD GO TO SLEEP AND NOT WAKE UP?: NO
6. HAVE YOU EVER DONE ANYTHING, STARTED TO DO ANYTHING, OR PREPARED TO DO ANYTHING TO END YOUR LIFE?: NO
2. HAVE YOU ACTUALLY HAD ANY THOUGHTS OF KILLING YOURSELF?: NO

## 2025-05-26 ASSESSMENT — PAIN - FUNCTIONAL ASSESSMENT: PAIN_FUNCTIONAL_ASSESSMENT: 0-10

## 2025-05-26 ASSESSMENT — PAIN DESCRIPTION - PAIN TYPE: TYPE: ACUTE PAIN

## 2025-05-26 ASSESSMENT — PAIN DESCRIPTION - FREQUENCY: FREQUENCY: CONSTANT/CONTINUOUS

## 2025-05-26 ASSESSMENT — LIFESTYLE VARIABLES
HOW OFTEN DO YOU HAVE 6 OR MORE DRINKS ON ONE OCCASION: NEVER
AUDIT-C TOTAL SCORE: 0
HOW MANY STANDARD DRINKS CONTAINING ALCOHOL DO YOU HAVE ON A TYPICAL DAY: PATIENT DOES NOT DRINK
HOW OFTEN DO YOU HAVE A DRINK CONTAINING ALCOHOL: NEVER
SKIP TO QUESTIONS 9-10: 1
AUDIT-C TOTAL SCORE: 0

## 2025-05-26 ASSESSMENT — PAIN DESCRIPTION - PROGRESSION: CLINICAL_PROGRESSION: NOT CHANGED

## 2025-05-26 ASSESSMENT — PAIN DESCRIPTION - DESCRIPTORS: DESCRIPTORS: BURNING

## 2025-05-26 ASSESSMENT — PAIN SCALES - GENERAL: PAINLEVEL_OUTOF10: 8

## 2025-05-26 ASSESSMENT — PAIN DESCRIPTION - LOCATION: LOCATION: ABDOMEN

## 2025-05-27 ENCOUNTER — PHARMACY VISIT (OUTPATIENT)
Dept: PHARMACY | Facility: CLINIC | Age: 60
End: 2025-05-27
Payer: MEDICARE

## 2025-05-27 ENCOUNTER — APPOINTMENT (OUTPATIENT)
Dept: CARDIOLOGY | Facility: HOSPITAL | Age: 60
End: 2025-05-27
Payer: COMMERCIAL

## 2025-05-27 VITALS
TEMPERATURE: 98.8 F | HEIGHT: 68 IN | RESPIRATION RATE: 18 BRPM | SYSTOLIC BLOOD PRESSURE: 150 MMHG | BODY MASS INDEX: 28.79 KG/M2 | DIASTOLIC BLOOD PRESSURE: 79 MMHG | OXYGEN SATURATION: 95 % | WEIGHT: 190 LBS | HEART RATE: 79 BPM

## 2025-05-27 PROBLEM — F45.0 SOMATIZATION DISORDER: Status: ACTIVE | Noted: 2025-05-27

## 2025-05-27 PROBLEM — R10.9 ABDOMINAL PAIN: Status: ACTIVE | Noted: 2025-05-27

## 2025-05-27 PROBLEM — R03.0 ELEVATED BLOOD PRESSURE READING: Status: ACTIVE | Noted: 2025-05-27

## 2025-05-27 PROBLEM — R11.2 NAUSEA & VOMITING: Status: ACTIVE | Noted: 2025-05-27

## 2025-05-27 PROBLEM — N17.9 ACUTE KIDNEY INJURY: Status: ACTIVE | Noted: 2025-05-27

## 2025-05-27 LAB
ANION GAP SERPL CALCULATED.3IONS-SCNC: 20 MMOL/L (ref 10–20)
BUN SERPL-MCNC: 36 MG/DL (ref 6–23)
CALCIUM SERPL-MCNC: 9.3 MG/DL (ref 8.6–10.3)
CHLORIDE SERPL-SCNC: 110 MMOL/L (ref 98–107)
CO2 SERPL-SCNC: 15 MMOL/L (ref 21–32)
CREAT SERPL-MCNC: 1.43 MG/DL (ref 0.5–1.3)
EGFRCR SERPLBLD CKD-EPI 2021: 56 ML/MIN/1.73M*2
ERYTHROCYTE [DISTWIDTH] IN BLOOD BY AUTOMATED COUNT: 12.3 % (ref 11.5–14.5)
GLUCOSE SERPL-MCNC: 126 MG/DL (ref 74–99)
HCT VFR BLD AUTO: 50.1 % (ref 41–52)
HGB BLD-MCNC: 15.9 G/DL (ref 13.5–17.5)
MCH RBC QN AUTO: 29 PG (ref 26–34)
MCHC RBC AUTO-ENTMCNC: 31.7 G/DL (ref 32–36)
MCV RBC AUTO: 91 FL (ref 80–100)
NRBC BLD-RTO: 0 /100 WBCS (ref 0–0)
PLATELET # BLD AUTO: 274 X10*3/UL (ref 150–450)
POTASSIUM SERPL-SCNC: 4.9 MMOL/L (ref 3.5–5.3)
RBC # BLD AUTO: 5.49 X10*6/UL (ref 4.5–5.9)
SODIUM SERPL-SCNC: 140 MMOL/L (ref 136–145)
WBC # BLD AUTO: 10.4 X10*3/UL (ref 4.4–11.3)

## 2025-05-27 PROCEDURE — 80048 BASIC METABOLIC PNL TOTAL CA: CPT | Performed by: STUDENT IN AN ORGANIZED HEALTH CARE EDUCATION/TRAINING PROGRAM

## 2025-05-27 PROCEDURE — 96372 THER/PROPH/DIAG INJ SC/IM: CPT | Performed by: STUDENT IN AN ORGANIZED HEALTH CARE EDUCATION/TRAINING PROGRAM

## 2025-05-27 PROCEDURE — RXMED WILLOW AMBULATORY MEDICATION CHARGE

## 2025-05-27 PROCEDURE — 85027 COMPLETE CBC AUTOMATED: CPT | Performed by: STUDENT IN AN ORGANIZED HEALTH CARE EDUCATION/TRAINING PROGRAM

## 2025-05-27 PROCEDURE — 99239 HOSP IP/OBS DSCHRG MGMT >30: CPT | Performed by: NURSE PRACTITIONER

## 2025-05-27 PROCEDURE — G0378 HOSPITAL OBSERVATION PER HR: HCPCS

## 2025-05-27 PROCEDURE — 93005 ELECTROCARDIOGRAM TRACING: CPT

## 2025-05-27 PROCEDURE — 96375 TX/PRO/DX INJ NEW DRUG ADDON: CPT | Mod: 59

## 2025-05-27 PROCEDURE — 36415 COLL VENOUS BLD VENIPUNCTURE: CPT | Performed by: STUDENT IN AN ORGANIZED HEALTH CARE EDUCATION/TRAINING PROGRAM

## 2025-05-27 PROCEDURE — 2500000001 HC RX 250 WO HCPCS SELF ADMINISTERED DRUGS (ALT 637 FOR MEDICARE OP): Performed by: STUDENT IN AN ORGANIZED HEALTH CARE EDUCATION/TRAINING PROGRAM

## 2025-05-27 PROCEDURE — 2500000004 HC RX 250 GENERAL PHARMACY W/ HCPCS (ALT 636 FOR OP/ED): Mod: JZ | Performed by: EMERGENCY MEDICINE

## 2025-05-27 PROCEDURE — 2500000004 HC RX 250 GENERAL PHARMACY W/ HCPCS (ALT 636 FOR OP/ED): Performed by: STUDENT IN AN ORGANIZED HEALTH CARE EDUCATION/TRAINING PROGRAM

## 2025-05-27 RX ORDER — FAMOTIDINE 20 MG/1
20 TABLET, FILM COATED ORAL DAILY
Status: DISCONTINUED | OUTPATIENT
Start: 2025-05-27 | End: 2025-05-27 | Stop reason: HOSPADM

## 2025-05-27 RX ORDER — ACETAMINOPHEN 650 MG/1
650 SUPPOSITORY RECTAL EVERY 4 HOURS PRN
Status: DISCONTINUED | OUTPATIENT
Start: 2025-05-27 | End: 2025-05-27 | Stop reason: HOSPADM

## 2025-05-27 RX ORDER — ACETAMINOPHEN 325 MG/1
650 TABLET ORAL EVERY 4 HOURS PRN
Status: DISCONTINUED | OUTPATIENT
Start: 2025-05-27 | End: 2025-05-27 | Stop reason: HOSPADM

## 2025-05-27 RX ORDER — ONDANSETRON HYDROCHLORIDE 2 MG/ML
4 INJECTION, SOLUTION INTRAVENOUS EVERY 8 HOURS PRN
Status: DISCONTINUED | OUTPATIENT
Start: 2025-05-27 | End: 2025-05-27 | Stop reason: HOSPADM

## 2025-05-27 RX ORDER — ACETAMINOPHEN 160 MG/5ML
650 SOLUTION ORAL EVERY 4 HOURS PRN
Status: DISCONTINUED | OUTPATIENT
Start: 2025-05-27 | End: 2025-05-27 | Stop reason: HOSPADM

## 2025-05-27 RX ORDER — HALOPERIDOL LACTATE 5 MG/ML
5 INJECTION, SOLUTION INTRAMUSCULAR ONCE
Status: COMPLETED | OUTPATIENT
Start: 2025-05-27 | End: 2025-05-27

## 2025-05-27 RX ORDER — SODIUM CHLORIDE 9 MG/ML
100 INJECTION, SOLUTION INTRAVENOUS CONTINUOUS
Status: DISCONTINUED | OUTPATIENT
Start: 2025-05-27 | End: 2025-05-27 | Stop reason: HOSPADM

## 2025-05-27 RX ORDER — FAMOTIDINE 10 MG/ML
20 INJECTION, SOLUTION INTRAVENOUS DAILY
Status: DISCONTINUED | OUTPATIENT
Start: 2025-05-27 | End: 2025-05-27 | Stop reason: HOSPADM

## 2025-05-27 RX ORDER — ONDANSETRON 4 MG/1
4 TABLET, ORALLY DISINTEGRATING ORAL EVERY 8 HOURS PRN
Status: DISCONTINUED | OUTPATIENT
Start: 2025-05-27 | End: 2025-05-27 | Stop reason: HOSPADM

## 2025-05-27 RX ORDER — ONDANSETRON 4 MG/1
4 TABLET, ORALLY DISINTEGRATING ORAL EVERY 8 HOURS PRN
Qty: 20 TABLET | Refills: 0 | Status: ON HOLD | OUTPATIENT
Start: 2025-05-27

## 2025-05-27 RX ORDER — POLYETHYLENE GLYCOL 3350 17 G/17G
17 POWDER, FOR SOLUTION ORAL DAILY
Status: DISCONTINUED | OUTPATIENT
Start: 2025-05-27 | End: 2025-05-27 | Stop reason: HOSPADM

## 2025-05-27 RX ORDER — ACETAMINOPHEN 500 MG
5 TABLET ORAL NIGHTLY PRN
Status: DISCONTINUED | OUTPATIENT
Start: 2025-05-27 | End: 2025-05-27 | Stop reason: HOSPADM

## 2025-05-27 RX ORDER — HYDRALAZINE HYDROCHLORIDE 20 MG/ML
10 INJECTION INTRAMUSCULAR; INTRAVENOUS EVERY 4 HOURS PRN
Status: DISCONTINUED | OUTPATIENT
Start: 2025-05-27 | End: 2025-05-27 | Stop reason: HOSPADM

## 2025-05-27 RX ADMIN — SODIUM CHLORIDE 100 ML/HR: 900 INJECTION, SOLUTION INTRAVENOUS at 01:54

## 2025-05-27 RX ADMIN — HYDRALAZINE HYDROCHLORIDE 10 MG: 20 INJECTION INTRAMUSCULAR; INTRAVENOUS at 02:39

## 2025-05-27 RX ADMIN — FAMOTIDINE 20 MG: 20 TABLET, FILM COATED ORAL at 08:39

## 2025-05-27 RX ADMIN — HALOPERIDOL LACTATE 5 MG: 5 INJECTION, SOLUTION INTRAMUSCULAR at 01:31

## 2025-05-27 RX ADMIN — POLYETHYLENE GLYCOL 3350 17 G: 17 POWDER, FOR SOLUTION ORAL at 08:39

## 2025-05-27 RX ADMIN — FAMOTIDINE 20 MG: 10 INJECTION, SOLUTION INTRAVENOUS at 00:00

## 2025-05-27 SDOH — ECONOMIC STABILITY: FOOD INSECURITY: WITHIN THE PAST 12 MONTHS, THE FOOD YOU BOUGHT JUST DIDN'T LAST AND YOU DIDN'T HAVE MONEY TO GET MORE.: NEVER TRUE

## 2025-05-27 SDOH — ECONOMIC STABILITY: FOOD INSECURITY: WITHIN THE PAST 12 MONTHS, YOU WORRIED THAT YOUR FOOD WOULD RUN OUT BEFORE YOU GOT THE MONEY TO BUY MORE.: NEVER TRUE

## 2025-05-27 SDOH — ECONOMIC STABILITY: HOUSING INSECURITY: AT ANY TIME IN THE PAST 12 MONTHS, WERE YOU HOMELESS OR LIVING IN A SHELTER (INCLUDING NOW)?: NO

## 2025-05-27 SDOH — SOCIAL STABILITY: SOCIAL INSECURITY: WITHIN THE LAST YEAR, HAVE YOU BEEN HUMILIATED OR EMOTIONALLY ABUSED IN OTHER WAYS BY YOUR PARTNER OR EX-PARTNER?: NO

## 2025-05-27 SDOH — SOCIAL STABILITY: SOCIAL INSECURITY: WITHIN THE LAST YEAR, HAVE YOU BEEN AFRAID OF YOUR PARTNER OR EX-PARTNER?: NO

## 2025-05-27 SDOH — ECONOMIC STABILITY: HOUSING INSECURITY: IN THE PAST 12 MONTHS, HOW MANY TIMES HAVE YOU MOVED WHERE YOU WERE LIVING?: 0

## 2025-05-27 SDOH — SOCIAL STABILITY: SOCIAL INSECURITY
WITHIN THE LAST YEAR, HAVE YOU BEEN KICKED, HIT, SLAPPED, OR OTHERWISE PHYSICALLY HURT BY YOUR PARTNER OR EX-PARTNER?: NO

## 2025-05-27 SDOH — SOCIAL STABILITY: SOCIAL INSECURITY
WITHIN THE LAST YEAR, HAVE YOU BEEN RAPED OR FORCED TO HAVE ANY KIND OF SEXUAL ACTIVITY BY YOUR PARTNER OR EX-PARTNER?: NO

## 2025-05-27 SDOH — ECONOMIC STABILITY: INCOME INSECURITY: IN THE PAST 12 MONTHS HAS THE ELECTRIC, GAS, OIL, OR WATER COMPANY THREATENED TO SHUT OFF SERVICES IN YOUR HOME?: NO

## 2025-05-27 SDOH — HEALTH STABILITY: PHYSICAL HEALTH
HOW OFTEN DO YOU NEED TO HAVE SOMEONE HELP YOU WHEN YOU READ INSTRUCTIONS, PAMPHLETS, OR OTHER WRITTEN MATERIAL FROM YOUR DOCTOR OR PHARMACY?: NEVER

## 2025-05-27 SDOH — ECONOMIC STABILITY: FOOD INSECURITY: HOW HARD IS IT FOR YOU TO PAY FOR THE VERY BASICS LIKE FOOD, HOUSING, MEDICAL CARE, AND HEATING?: NOT HARD AT ALL

## 2025-05-27 SDOH — ECONOMIC STABILITY: HOUSING INSECURITY: IN THE LAST 12 MONTHS, WAS THERE A TIME WHEN YOU WERE NOT ABLE TO PAY THE MORTGAGE OR RENT ON TIME?: NO

## 2025-05-27 SDOH — ECONOMIC STABILITY: TRANSPORTATION INSECURITY: IN THE PAST 12 MONTHS, HAS LACK OF TRANSPORTATION KEPT YOU FROM MEDICAL APPOINTMENTS OR FROM GETTING MEDICATIONS?: NO

## 2025-05-27 ASSESSMENT — COGNITIVE AND FUNCTIONAL STATUS - GENERAL
DAILY ACTIVITIY SCORE: 24
DAILY ACTIVITIY SCORE: 24
MOBILITY SCORE: 24
MOBILITY SCORE: 24
PATIENT BASELINE BEDBOUND: NO

## 2025-05-27 ASSESSMENT — ACTIVITIES OF DAILY LIVING (ADL)
HEARING - RIGHT EAR: FUNCTIONAL
ADEQUATE_TO_COMPLETE_ADL: YES
DRESSING YOURSELF: INDEPENDENT
TOILETING: INDEPENDENT
FEEDING YOURSELF: INDEPENDENT
LACK_OF_TRANSPORTATION: NO
PATIENT'S MEMORY ADEQUATE TO SAFELY COMPLETE DAILY ACTIVITIES?: YES
HEARING - LEFT EAR: FUNCTIONAL
BATHING: INDEPENDENT
JUDGMENT_ADEQUATE_SAFELY_COMPLETE_DAILY_ACTIVITIES: YES
LACK_OF_TRANSPORTATION: NO
WALKS IN HOME: INDEPENDENT
GROOMING: INDEPENDENT

## 2025-05-27 ASSESSMENT — PAIN - FUNCTIONAL ASSESSMENT: PAIN_FUNCTIONAL_ASSESSMENT: 0-10

## 2025-05-27 ASSESSMENT — PAIN SCALES - GENERAL
PAINLEVEL_OUTOF10: 3
PAINLEVEL_OUTOF10: 3

## 2025-05-27 NOTE — CARE PLAN
The patient's goals for the shift include see providers, know plan of care, and pain control.    The clinical goals for the shift include pain management, comfort, maintain safety, IV fluids, await for GI/Nephrology's input, encourage OOB to chair, and monitor labs/VS.    Problem: Pain - Adult  Goal: Verbalizes/displays adequate comfort level or baseline comfort level  5/27/2025 0812 by Sada Sotomayor RN  Outcome: Progressing  5/27/2025 0812 by Sada Sotomayor RN  Outcome: Progressing     Problem: Safety - Adult  Goal: Free from fall injury  5/27/2025 0812 by Sada Sotomayor RN  Outcome: Progressing  5/27/2025 0812 by Sada Sotomayor RN  Outcome: Progressing     Problem: Discharge Planning  Goal: Discharge to home or other facility with appropriate resources  5/27/2025 0812 by Sada Sotomayor RN  Outcome: Progressing  5/27/2025 0812 by Sada Sotomayor RN  Outcome: Progressing     Problem: Chronic Conditions and Co-morbidities  Goal: Patient's chronic conditions and co-morbidity symptoms are monitored and maintained or improved  5/27/2025 0812 by Sada Sotomayor RN  Outcome: Progressing  5/27/2025 0812 by Sada Sotomayor RN  Outcome: Progressing     Problem: Nutrition  Goal: Nutrient intake appropriate for maintaining nutritional needs  5/27/2025 0812 by Sada Sotomayor RN  Outcome: Progressing  5/27/2025 0812 by Sada Sotomayor RN  Outcome: Progressing     Problem: Deep Vein Thrombosis  Goal: I will remain free from complications of deep vein thrombosis and maintain current level of mobility  5/27/2025 0812 by Sada Sotomayor RN  Outcome: Progressing  5/27/2025 0812 by Sada Sotomayor RN  Outcome: Progressing     Problem: Pain  Goal: Takes deep breaths with improved pain control throughout the shift  Outcome: Progressing  Goal: Turns in bed with improved pain control throughout the shift  Outcome: Progressing  Goal: Walks with improved pain control throughout the shift  Outcome: Progressing  Goal: Performs ADL's with improved pain control  throughout shift  Outcome: Progressing  Goal: Participates in PT with improved pain control throughout the shift  Outcome: Progressing  Goal: Free from opioid side effects throughout the shift  Outcome: Progressing  Goal: Free from acute confusion related to pain meds throughout the shift  Outcome: Progressing

## 2025-05-27 NOTE — DISCHARGE SUMMARY
Discharge Diagnosis  DELFINO (acute kidney injury), abdominal pain, N/V       Issues Requiring Follow-Up  As above    Discharge Meds     Medication List      START taking these medications     ondansetron ODT 4 mg disintegrating tablet; Commonly known as:   Zofran-ODT; Dissolve 1 tablet (4 mg) in the mouth every 8 hours if needed   for nausea or vomiting.     CONTINUE taking these medications     ibuprofen 800 mg tablet   omeprazole 20 mg DR capsule; Commonly known as: PriLOSEC; Take 1 capsule   (20 mg) by mouth once daily. Do not crush or chew.       Test Results Pending At Discharge  Pending Labs       No current pending labs.            Hospital Course   Admitted for further observation for N/V, abd pain and mild DELFINO. He was given multiple medications to control his symptoms, haldol helped him the best. He has significant anxiety. CT abd/pelvis with no acute findings. He was given IVF with improvement in his renal function. Dr. Gaytan saw the patient, discussed with him and has cleared him fro DC with labs in 1 week. He will follow up with GI outpatient as there is no inpatient coverage today. His nausea is resolved and pain is improved. He is tolerating diet and drinking without issue. Cleared for DC today and instructed to call GI office for appointment today.     Case and plan was discussed with patient, he is agreeable.   Case and plan was also discussed with my collaborating physician.     Time spent 35 minutes.     Pertinent Physical Exam At Time of Discharge  Physical Exam    Patient seen and examined. Sitting up in the chair. Feels much better, denies any current pain or nausea.     General: Alert and oriented x3, pleasant, anxious.   Cardiac: Regular rate and rhythm, S1/S2 , no murmur.   Pulmonary: Clear to auscultation on room air.   Abdomen: Soft, round, nontender. BS +x4.   Extremities: No edema.  Skin: No rashes or lesions.      Outpatient Follow-Up  No future appointments.    Call for GI appointment  ASAP.   BMP in 1 week.     Juana Thapa, APRN-CNP

## 2025-05-27 NOTE — ASSESSMENT & PLAN NOTE
CR 1.92, EGFR 39, typically has a normal baseline.  Avoid nephrotoxic agents  IV fluid hydration  Nephrology consulted

## 2025-05-27 NOTE — NURSING NOTE
Patient unable to wear tele, unable to keep leads on, moving around too much. Dr. Mosquera notified.       05/27/25 at 1:45 AM - Belén Jean Baptiste RN

## 2025-05-27 NOTE — NURSING NOTE
Patient came to floor sobbing VERY loudly, per ER nurse patient has been sobbing very loud since he came into the ER no matter what medications were given. Patient states he is in so much pain in his abdomen. I explained to the patient that nothing was found in the CT of his abd and his CXR, but GI and renal doctors were consulted to figure out more. Patient flailing around in the bed back and forth, walking all over, unable to sit still, while crying out loud enough to be heard down the bhatia with his door shut. Per Dr. Mosquera give him haldol to calm him down. Patient was calm approximately 30 minutes after given the haldol and pain was at a 3, no pain meds were given. Patient resting comfortably in his bed, belongings within reach, bed low and locked.      05/27/25 at 4:04 AM - Belén Jean Baptiste RN

## 2025-05-27 NOTE — ASSESSMENT & PLAN NOTE
Intake /138  As needed hydralazine  Telemetry  Renal ultrasound deferring to nephrology  Nephrology input appreciated

## 2025-05-27 NOTE — CONSULTS
"Reason For Consult  Acute kidney injury    History Of Present Illness  60-year-old male, no previous history of chronic kidney disease, baseline creatinine is typically normal.  He was admitted overnight secondary to 4 days of nausea, poor oral intake this is secondary to abdominal pain.  A presentation was an acute kidney injury with creatinine of 1.9.  He had a CT scan which showed nephrolithiasis but no hydronephrosis, he was not taking any obvious nephrotoxic medications, denies NSAID use, did say he took some opioids and Phenergan he denies lower urinary tract symptoms, you started on fluids, creatinine has down trended to 1.4 today.  He feels well and wants to go home     Past Medical History  He has no past medical history on file.    Surgical History  He has no past surgical history on file.     Social History  He reports that he has never smoked. He has never used smokeless tobacco. He reports that he does not drink alcohol and does not use drugs.    Family History  Family History[1]     Allergies  Morphine    Review of Systems  Gen: no fever, no weight loss  Cardiac: No chest pain  Pulm: no dyspnea or cough  GI: As above   no dysuria, urgemcy of LUTS  Neuro: no focal weakness  MSK: no joint pains        Physical Exam  Gen: NAD  HENT: atraumatic  Heart: RRR  Lungs: CTA  Abdomen: Soft  Ext; no edema  Neuro: non focal  Psych : AAO x 3            I&O 24HR    Intake/Output Summary (Last 24 hours) at 5/27/2025 1206  Last data filed at 5/27/2025 1143  Gross per 24 hour   Intake 1947.36 ml   Output 400 ml   Net 1547.36 ml       Vitals 24HR  Heart Rate:  [66-84]   Temp:  [36.6 °C (97.9 °F)-37.5 °C (99.5 °F)]   Resp:  [14-20]   BP: (109-186)/()   Height:  [172.7 cm (5' 8\")]   Weight:  [86.2 kg (190 lb)]   SpO2:  [95 %-98 %]         Relevant Results  Results for orders placed or performed during the hospital encounter of 05/26/25 (from the past 24 hours)   CBC and Auto Differential   Result Value Ref Range    " WBC 15.1 (H) 4.4 - 11.3 x10*3/uL    nRBC 0.0 0.0 - 0.0 /100 WBCs    RBC 5.82 4.50 - 5.90 x10*6/uL    Hemoglobin 16.8 13.5 - 17.5 g/dL    Hematocrit 49.6 41.0 - 52.0 %    MCV 85 80 - 100 fL    MCH 28.9 26.0 - 34.0 pg    MCHC 33.9 32.0 - 36.0 g/dL    RDW 12.4 11.5 - 14.5 %    Platelets 382 150 - 450 x10*3/uL    Neutrophils % 88.3 40.0 - 80.0 %    Immature Granulocytes %, Automated 0.6 0.0 - 0.9 %    Lymphocytes % 7.1 13.0 - 44.0 %    Monocytes % 3.7 2.0 - 10.0 %    Eosinophils % 0.0 0.0 - 6.0 %    Basophils % 0.3 0.0 - 2.0 %    Neutrophils Absolute 13.30 (H) 1.20 - 7.70 x10*3/uL    Immature Granulocytes Absolute, Automated 0.09 0.00 - 0.70 x10*3/uL    Lymphocytes Absolute 1.07 (L) 1.20 - 4.80 x10*3/uL    Monocytes Absolute 0.56 0.10 - 1.00 x10*3/uL    Eosinophils Absolute 0.00 0.00 - 0.70 x10*3/uL    Basophils Absolute 0.04 0.00 - 0.10 x10*3/uL   Comprehensive metabolic panel   Result Value Ref Range    Glucose 224 (H) 74 - 99 mg/dL    Sodium 141 136 - 145 mmol/L    Potassium 4.6 3.5 - 5.3 mmol/L    Chloride 105 98 - 107 mmol/L    Bicarbonate 20 (L) 21 - 32 mmol/L    Anion Gap 21 (H) 10 - 20 mmol/L    Urea Nitrogen 33 (H) 6 - 23 mg/dL    Creatinine 1.92 (H) 0.50 - 1.30 mg/dL    eGFR 39 (L) >60 mL/min/1.73m*2    Calcium 10.2 8.6 - 10.3 mg/dL    Albumin 5.4 (H) 3.4 - 5.0 g/dL    Alkaline Phosphatase 50 33 - 136 U/L    Total Protein 8.9 (H) 6.4 - 8.2 g/dL    AST 22 9 - 39 U/L    Bilirubin, Total 0.9 0.0 - 1.2 mg/dL    ALT 19 10 - 52 U/L   Lipase   Result Value Ref Range    Lipase 8 (L) 9 - 82 U/L   CBC   Result Value Ref Range    WBC 10.4 4.4 - 11.3 x10*3/uL    nRBC 0.0 0.0 - 0.0 /100 WBCs    RBC 5.49 4.50 - 5.90 x10*6/uL    Hemoglobin 15.9 13.5 - 17.5 g/dL    Hematocrit 50.1 41.0 - 52.0 %    MCV 91 80 - 100 fL    MCH 29.0 26.0 - 34.0 pg    MCHC 31.7 (L) 32.0 - 36.0 g/dL    RDW 12.3 11.5 - 14.5 %    Platelets 274 150 - 450 x10*3/uL   Basic metabolic panel   Result Value Ref Range    Glucose 126 (H) 74 - 99 mg/dL     Sodium 140 136 - 145 mmol/L    Potassium 4.9 3.5 - 5.3 mmol/L    Chloride 110 (H) 98 - 107 mmol/L    Bicarbonate 15 (L) 21 - 32 mmol/L    Anion Gap 20 10 - 20 mmol/L    Urea Nitrogen 36 (H) 6 - 23 mg/dL    Creatinine 1.43 (H) 0.50 - 1.30 mg/dL    eGFR 56 (L) >60 mL/min/1.73m*2    Calcium 9.3 8.6 - 10.3 mg/dL          Assessment & Plan  DELFINO (acute kidney injury)    Elevated blood pressure reading    Nausea & vomiting    Abdominal pain    60-year-old male with nonoliguric acute kidney injury  Metabolic acidosis      -Acute kidney injury clinically consistent with volume depletion which is improved with volume expansion.  - Acidosis is Is nongap and likely from acute kidney injury.  This should improve as renal function improves and should not need bicarbonate supplementation  - He is doing well, he feels his GI issues have resolved, not unreasonable for discharge, would expect his renal function to improve back to normal and should probably get labs in about a week to document this.  - Thank you for the consult, no objections to discharge, discussed with primary team.          Tariq Gaytan MD         [1]   Family History  Problem Relation Name Age of Onset    Cancer Mother

## 2025-05-27 NOTE — H&P
History Of Present Illness  Levy Flores is a 60 y.o. male presenting with intractable nausea vomiting and abdominal pain.    This is a 60-year-old male with past medical history of nausea, vomiting, abdominal pain, upper GI bleed, GERD, chronic diverticulitis with remote colon resection, status post cholecystectomy, somatization disorder, and other comorbidities presented to the ED with chief complaint of intractable nausea, vomiting and abdominal pain which started this morning.  He does endorse having 20-year history of similar problems with multiple hospitalization and emergency department visit.  He does follow with GI but has not seen his gastroenterologist for a couple years.  He did report URI symptoms of feeling sick with cough, shortness of breath, myalgias and runny nose which started over the weekend.  He does not take any home medications.  Patient denies chest pain or palpitations, diarrhea, fever or chills.    Difficult interview, his level of discomfort and reactions do seem out of proportion with imaging, laboratory and physical findings.    In the ED on admission vitals notable for /138, labs noted for glucose 224, bicarb 20, AG 21, BUN 33, CR 1.92, EGFR 39, lipase 8, WBC 15.1.    EKG: Normal sinus rhythm, incomplete RBBB, vent rate 100 bpm, QTc 433.    CT abdomen pelvis Without IV contrast: Impression  The no definite acute abdominal/pelvic abnormality identified. Few   non-specific fluid-filled loops of nondistended small bowel noted.   Nephrolithiasis, trace hiatal hernia and additional findings as   detailed.       In the ED patient was given GI cocktail, 5 mg of Valium, 100 mcg fentanyl, 1 mg Dilaudid, 4 mg Zofran, 1 L normal saline bolus.    Admitted for DELFINO, as well as intractable nausea, vomiting and abdominal pain      Past Medical History  Medical History[1]    Surgical History  Surgical History[2]     Social History  He reports that he has never smoked. He has never used  "smokeless tobacco. He reports that he does not drink alcohol and does not use drugs.    Family History  Family History[3]     Allergies  Morphine    Review of Systems   General: no fatigue, no malaise, no fevers/chills   HENT: no rhinorrhea, no sore throat, no ear pain   Eyes: no change in vision, denies eye pain or discharge   Lungs: no SOB, no cough, no hemoptysis   CV: no chest pain, no palpitations, no leg edema   Abd: Nausea and vomiting, generalized abdominal pain, no constipation or diarrhea.   : no dysuria, no frequency, no nocturia, no flank pain   Endocrine: no polydipsia/polyuria, no hot or cold intolerance   Neuro: no headaches, no syncope, no seizures   MSK: no back pain, no neck pain, no joint problems   Psych: no anxiety, no depression, no hallucinations    Physical Exam   General: alert, no diaphoresis, ill-appearing, squirming around in pain   HENT: mucous membranes moist, external ears normal, no rhinorrhea   Eyes: no icterus or injection, no discharge   Lungs: CTA BL   Heart: RRR, no murmurs, no LE edema BL   GI: abdomen soft, nontender, nondistended, BS present   MSK: no joint effusion or deformity   Skin: no rashes, erythema, or ecchymosis   Neuro: grossly normal cognition, motor strength, sensation    Last Recorded Vitals  Blood pressure (!) 185/138, pulse 82, temperature 36.6 °C (97.9 °F), temperature source Temporal, resp. rate 20, height 1.727 m (5' 8\"), weight 86.2 kg (190 lb), SpO2 96%.    Relevant Results  Results for orders placed or performed during the hospital encounter of 05/26/25 (from the past 24 hours)   CBC and Auto Differential   Result Value Ref Range    WBC 15.1 (H) 4.4 - 11.3 x10*3/uL    nRBC 0.0 0.0 - 0.0 /100 WBCs    RBC 5.82 4.50 - 5.90 x10*6/uL    Hemoglobin 16.8 13.5 - 17.5 g/dL    Hematocrit 49.6 41.0 - 52.0 %    MCV 85 80 - 100 fL    MCH 28.9 26.0 - 34.0 pg    MCHC 33.9 32.0 - 36.0 g/dL    RDW 12.4 11.5 - 14.5 %    Platelets 382 150 - 450 x10*3/uL    Neutrophils % " 88.3 40.0 - 80.0 %    Immature Granulocytes %, Automated 0.6 0.0 - 0.9 %    Lymphocytes % 7.1 13.0 - 44.0 %    Monocytes % 3.7 2.0 - 10.0 %    Eosinophils % 0.0 0.0 - 6.0 %    Basophils % 0.3 0.0 - 2.0 %    Neutrophils Absolute 13.30 (H) 1.20 - 7.70 x10*3/uL    Immature Granulocytes Absolute, Automated 0.09 0.00 - 0.70 x10*3/uL    Lymphocytes Absolute 1.07 (L) 1.20 - 4.80 x10*3/uL    Monocytes Absolute 0.56 0.10 - 1.00 x10*3/uL    Eosinophils Absolute 0.00 0.00 - 0.70 x10*3/uL    Basophils Absolute 0.04 0.00 - 0.10 x10*3/uL   Comprehensive metabolic panel   Result Value Ref Range    Glucose 224 (H) 74 - 99 mg/dL    Sodium 141 136 - 145 mmol/L    Potassium 4.6 3.5 - 5.3 mmol/L    Chloride 105 98 - 107 mmol/L    Bicarbonate 20 (L) 21 - 32 mmol/L    Anion Gap 21 (H) 10 - 20 mmol/L    Urea Nitrogen 33 (H) 6 - 23 mg/dL    Creatinine 1.92 (H) 0.50 - 1.30 mg/dL    eGFR 39 (L) >60 mL/min/1.73m*2    Calcium 10.2 8.6 - 10.3 mg/dL    Albumin 5.4 (H) 3.4 - 5.0 g/dL    Alkaline Phosphatase 50 33 - 136 U/L    Total Protein 8.9 (H) 6.4 - 8.2 g/dL    AST 22 9 - 39 U/L    Bilirubin, Total 0.9 0.0 - 1.2 mg/dL    ALT 19 10 - 52 U/L   Lipase   Result Value Ref Range    Lipase 8 (L) 9 - 82 U/L       This is a 60-year-old male with history of upper and lower GI pathology presented with nausea vomiting and generalized abdominal pain.  Labs indicated of DELFINO.  Nephrology and GI consulted.  Assessment & Plan  DELFINO (acute kidney injury)  CR 1.92, EGFR 39, typically has a normal baseline.  Avoid nephrotoxic agents  IV fluid hydration  Nephrology consulted    Nausea & vomiting  Abdominal pain  History of multiple admissions for nausea, vomiting and abdominal pain.  History of partial colectomy secondary to diverticulitis, upper GI bleed, GERD.  CT abdomen pelvis without IV contrast nonacute  Pain out of proportion with findings.  No CT angio abdomen pelvis due to DELFINO.  Lipase normal  Pain control  Antiemetics  GI consulted.    Elevated blood  pressure reading  Intake /138  As needed hydralazine  Telemetry  Renal ultrasound deferring to nephrology  Nephrology input appreciated      DVT prophylaxis: SCD    CODE STATUS: Full code    I spent 60 minutes in the professional and overall care of this patient.      Reji Mosquera MD         [1] No past medical history on file.  [2] No past surgical history on file.  [3]   Family History  Problem Relation Name Age of Onset    Cancer Mother

## 2025-05-27 NOTE — ED PROVIDER NOTES
HPI   Chief Complaint   Patient presents with    Flu Symptoms    Vomiting     Pt felt sick all weekend with cough, shortness of breath, body aches, runny nose. Pt started to vomit this morning.       HPI  60-year-old male presents with complaint of cough, nausea, abdominal pain, body aches.  Patient had developed cough last few days.  He has some stomach issues and occasionally takes some medicine for it.  No fevers or chills.  No shortness of breath.  No change in her bowel habits.  Nothing seems to make it better or worse.  No other complaints.   Patient History   Medical History[1]  Surgical History[2]  Family History[3]  Social History[4]    Physical Exam   ED Triage Vitals [05/26/25 2051]   Temperature Heart Rate Respirations BP   36.6 °C (97.9 °F) 82 20 (!) 185/138      Pulse Ox Temp Source Heart Rate Source Patient Position   96 % Temporal Monitor Sitting      BP Location FiO2 (%)     Right arm --       Physical Exam  . General:  Awake, alert, no acute distress.  Head: Normocephalic, Atraumatic  Neck: Supple, trachea midline, no stridor  Skin: Warm and dry, no rashes   Lungs: Clear to auscultation bilaterally no acute respiratory distress, speaking in full sentences without difficulty  CV: Regular Rate Rhythm with no obvious murmurs gallops rubs noted, no jugular venous distention, no pedal edema   Abdomen: Soft, mild diffuse epigastric tenderness, nondistended, positive bowel sounds, no peritoneal signs  Neuro:  No gross focal neurologic deficits, NIH is 0  Musculoskeletal:  Full range of motion in all 4 extremities  Psychiatric:  Alert oriented x 3, Good insight into condition.  Crying patient was treated with IV fluids, GI cocktail, fentanyl, Dilaudid, Valium he still crying.    ED Course & MDM   Diagnoses as of 05/27/25 0222   DELFINO (acute kidney injury)   Nausea and vomiting, unspecified vomiting type                 No data recorded     Manny Coma Scale Score: 15 (05/26/25 2102 : Felisha Robert RN)                            Medical Decision Making  Imaging is unremarkable.  He does appear to have acute kidney injury with creatinine 1.9 and GFR 39.  In light of his acute kidney injury and continued abdominal pain and felt to benefit from mission.  Contact the hospitalist for admission.    Procedure  Procedures       [1] No past medical history on file.  [2] No past surgical history on file.  [3]   Family History  Problem Relation Name Age of Onset    Cancer Mother     [4]   Social History  Tobacco Use    Smoking status: Never    Smokeless tobacco: Never   Substance Use Topics    Alcohol use: Never    Drug use: Never        Ric Oneill DO  05/27/25 0224

## 2025-05-27 NOTE — NURSING NOTE
Assumed care of patient. Patient resting in bed, breathing regular and unlabored. Call light within reach. Will continue plan of care.

## 2025-05-27 NOTE — CARE PLAN
The patient's goals for the shift include  pain control and rest    The clinical goals for the shift include IV fluids, monitor VS, pain management, safety, and promote rest      05/27/25 at 2:14 AM - Belén Jean Baptiste RN

## 2025-05-27 NOTE — ASSESSMENT & PLAN NOTE
History of multiple admissions for nausea, vomiting and abdominal pain.  History of partial colectomy secondary to diverticulitis, upper GI bleed, GERD.  CT abdomen pelvis without IV contrast nonacute  Pain out of proportion with findings.  No CT angio abdomen pelvis due to DELFINO.  Lipase normal  Pain control  Antiemetics  GI consulted.

## 2025-05-28 ENCOUNTER — HOSPITAL ENCOUNTER (EMERGENCY)
Facility: HOSPITAL | Age: 60
Discharge: HOME | End: 2025-05-28
Attending: EMERGENCY MEDICINE
Payer: COMMERCIAL

## 2025-05-28 ENCOUNTER — APPOINTMENT (OUTPATIENT)
Dept: CARDIOLOGY | Facility: HOSPITAL | Age: 60
End: 2025-05-28
Payer: COMMERCIAL

## 2025-05-28 VITALS
TEMPERATURE: 98 F | HEIGHT: 68 IN | SYSTOLIC BLOOD PRESSURE: 162 MMHG | RESPIRATION RATE: 17 BRPM | WEIGHT: 190 LBS | DIASTOLIC BLOOD PRESSURE: 85 MMHG | BODY MASS INDEX: 28.79 KG/M2 | OXYGEN SATURATION: 96 % | HEART RATE: 99 BPM

## 2025-05-28 DIAGNOSIS — B34.9 NONSPECIFIC SYNDROME SUGGESTIVE OF VIRAL ILLNESS: ICD-10-CM

## 2025-05-28 DIAGNOSIS — R10.84 ABDOMINAL PAIN, GENERALIZED: Primary | ICD-10-CM

## 2025-05-28 DIAGNOSIS — R11.2 NAUSEA AND VOMITING, UNSPECIFIED VOMITING TYPE: ICD-10-CM

## 2025-05-28 DIAGNOSIS — R19.7 DIARRHEA OF PRESUMED INFECTIOUS ORIGIN: ICD-10-CM

## 2025-05-28 LAB
ALBUMIN SERPL BCP-MCNC: 4.8 G/DL (ref 3.4–5)
ALP SERPL-CCNC: 42 U/L (ref 33–136)
ALT SERPL W P-5'-P-CCNC: 38 U/L (ref 10–52)
ANION GAP SERPL CALCULATED.3IONS-SCNC: 15 MMOL/L (ref 10–20)
APPEARANCE UR: CLEAR
AST SERPL W P-5'-P-CCNC: 49 U/L (ref 9–39)
BASOPHILS # BLD AUTO: 0.05 X10*3/UL (ref 0–0.1)
BASOPHILS NFR BLD AUTO: 0.4 %
BILIRUB SERPL-MCNC: 1.2 MG/DL (ref 0–1.2)
BILIRUB UR STRIP.AUTO-MCNC: NEGATIVE MG/DL
BUN SERPL-MCNC: 27 MG/DL (ref 6–23)
CALCIUM SERPL-MCNC: 9.3 MG/DL (ref 8.6–10.3)
CHLORIDE SERPL-SCNC: 104 MMOL/L (ref 98–107)
CO2 SERPL-SCNC: 22 MMOL/L (ref 21–32)
COLOR UR: YELLOW
CREAT SERPL-MCNC: 1.23 MG/DL (ref 0.5–1.3)
EGFRCR SERPLBLD CKD-EPI 2021: 67 ML/MIN/1.73M*2
EOSINOPHIL # BLD AUTO: 0.01 X10*3/UL (ref 0–0.7)
EOSINOPHIL NFR BLD AUTO: 0.1 %
ERYTHROCYTE [DISTWIDTH] IN BLOOD BY AUTOMATED COUNT: 12.2 % (ref 11.5–14.5)
GLUCOSE SERPL-MCNC: 122 MG/DL (ref 74–99)
GLUCOSE UR STRIP.AUTO-MCNC: NORMAL MG/DL
HCT VFR BLD AUTO: 45.4 % (ref 41–52)
HGB BLD-MCNC: 15.4 G/DL (ref 13.5–17.5)
IMM GRANULOCYTES # BLD AUTO: 0.05 X10*3/UL (ref 0–0.7)
IMM GRANULOCYTES NFR BLD AUTO: 0.4 % (ref 0–0.9)
KETONES UR STRIP.AUTO-MCNC: NEGATIVE MG/DL
LACTATE SERPL-SCNC: 1.9 MMOL/L (ref 0.4–2)
LEUKOCYTE ESTERASE UR QL STRIP.AUTO: NEGATIVE
LIPASE SERPL-CCNC: 21 U/L (ref 9–82)
LYMPHOCYTES # BLD AUTO: 1.88 X10*3/UL (ref 1.2–4.8)
LYMPHOCYTES NFR BLD AUTO: 13.7 %
MCH RBC QN AUTO: 28.8 PG (ref 26–34)
MCHC RBC AUTO-ENTMCNC: 33.9 G/DL (ref 32–36)
MCV RBC AUTO: 85 FL (ref 80–100)
MONOCYTES # BLD AUTO: 0.83 X10*3/UL (ref 0.1–1)
MONOCYTES NFR BLD AUTO: 6 %
MUCOUS THREADS #/AREA URNS AUTO: NORMAL /LPF
NEUTROPHILS # BLD AUTO: 10.95 X10*3/UL (ref 1.2–7.7)
NEUTROPHILS NFR BLD AUTO: 79.4 %
NITRITE UR QL STRIP.AUTO: NEGATIVE
NRBC BLD-RTO: 0 /100 WBCS (ref 0–0)
PH UR STRIP.AUTO: 5.5 [PH]
PLATELET # BLD AUTO: 341 X10*3/UL (ref 150–450)
POTASSIUM SERPL-SCNC: 4.4 MMOL/L (ref 3.5–5.3)
PROT SERPL-MCNC: 8 G/DL (ref 6.4–8.2)
PROT UR STRIP.AUTO-MCNC: ABNORMAL MG/DL
RBC # BLD AUTO: 5.34 X10*6/UL (ref 4.5–5.9)
RBC # UR STRIP.AUTO: NEGATIVE MG/DL
RBC #/AREA URNS AUTO: NORMAL /HPF
SODIUM SERPL-SCNC: 137 MMOL/L (ref 136–145)
SP GR UR STRIP.AUTO: 1.03
UROBILINOGEN UR STRIP.AUTO-MCNC: NORMAL MG/DL
WBC # BLD AUTO: 13.8 X10*3/UL (ref 4.4–11.3)
WBC #/AREA URNS AUTO: NORMAL /HPF

## 2025-05-28 PROCEDURE — 81001 URINALYSIS AUTO W/SCOPE: CPT | Performed by: EMERGENCY MEDICINE

## 2025-05-28 PROCEDURE — 96372 THER/PROPH/DIAG INJ SC/IM: CPT | Performed by: EMERGENCY MEDICINE

## 2025-05-28 PROCEDURE — 36415 COLL VENOUS BLD VENIPUNCTURE: CPT | Performed by: EMERGENCY MEDICINE

## 2025-05-28 PROCEDURE — 2500000004 HC RX 250 GENERAL PHARMACY W/ HCPCS (ALT 636 FOR OP/ED): Mod: JZ | Performed by: EMERGENCY MEDICINE

## 2025-05-28 PROCEDURE — 96361 HYDRATE IV INFUSION ADD-ON: CPT

## 2025-05-28 PROCEDURE — 83690 ASSAY OF LIPASE: CPT | Performed by: EMERGENCY MEDICINE

## 2025-05-28 PROCEDURE — 99284 EMERGENCY DEPT VISIT MOD MDM: CPT | Mod: 25 | Performed by: EMERGENCY MEDICINE

## 2025-05-28 PROCEDURE — 83605 ASSAY OF LACTIC ACID: CPT | Performed by: EMERGENCY MEDICINE

## 2025-05-28 PROCEDURE — 96375 TX/PRO/DX INJ NEW DRUG ADDON: CPT

## 2025-05-28 PROCEDURE — 85025 COMPLETE CBC W/AUTO DIFF WBC: CPT | Performed by: EMERGENCY MEDICINE

## 2025-05-28 PROCEDURE — 93005 ELECTROCARDIOGRAM TRACING: CPT

## 2025-05-28 PROCEDURE — 96374 THER/PROPH/DIAG INJ IV PUSH: CPT | Mod: 59

## 2025-05-28 PROCEDURE — 84075 ASSAY ALKALINE PHOSPHATASE: CPT | Performed by: EMERGENCY MEDICINE

## 2025-05-28 RX ORDER — WATER
200 LIQUID (ML) MISCELLANEOUS
Status: DISCONTINUED | OUTPATIENT
Start: 2025-05-28 | End: 2025-05-28 | Stop reason: HOSPADM

## 2025-05-28 RX ORDER — KETOROLAC TROMETHAMINE 15 MG/ML
15 INJECTION, SOLUTION INTRAMUSCULAR; INTRAVENOUS ONCE
Status: COMPLETED | OUTPATIENT
Start: 2025-05-28 | End: 2025-05-28

## 2025-05-28 RX ORDER — DICYCLOMINE HYDROCHLORIDE 20 MG/1
20 TABLET ORAL
Qty: 30 TABLET | Refills: 0 | Status: ON HOLD | OUTPATIENT
Start: 2025-05-28

## 2025-05-28 RX ORDER — DICYCLOMINE HYDROCHLORIDE 10 MG/ML
20 INJECTION INTRAMUSCULAR ONCE
Status: COMPLETED | OUTPATIENT
Start: 2025-05-28 | End: 2025-05-28

## 2025-05-28 RX ORDER — FAMOTIDINE 20 MG/1
20 TABLET, FILM COATED ORAL DAILY
Qty: 30 TABLET | Refills: 0 | Status: ON HOLD | OUTPATIENT
Start: 2025-05-28 | End: 2025-06-27

## 2025-05-28 RX ORDER — DIPHENHYDRAMINE HYDROCHLORIDE 50 MG/ML
50 INJECTION, SOLUTION INTRAMUSCULAR; INTRAVENOUS ONCE
Status: COMPLETED | OUTPATIENT
Start: 2025-05-28 | End: 2025-05-28

## 2025-05-28 RX ORDER — DROPERIDOL 2.5 MG/ML
0.62 INJECTION, SOLUTION INTRAMUSCULAR; INTRAVENOUS EVERY 6 HOURS PRN
Status: DISCONTINUED | OUTPATIENT
Start: 2025-05-28 | End: 2025-05-28 | Stop reason: HOSPADM

## 2025-05-28 RX ORDER — PANTOPRAZOLE SODIUM 40 MG/10ML
40 INJECTION, POWDER, LYOPHILIZED, FOR SOLUTION INTRAVENOUS ONCE
Status: COMPLETED | OUTPATIENT
Start: 2025-05-28 | End: 2025-05-28

## 2025-05-28 RX ORDER — HALOPERIDOL LACTATE 5 MG/ML
3 INJECTION, SOLUTION INTRAMUSCULAR ONCE
Status: COMPLETED | OUTPATIENT
Start: 2025-05-28 | End: 2025-05-28

## 2025-05-28 RX ADMIN — KETOROLAC TROMETHAMINE 15 MG: 15 INJECTION, SOLUTION INTRAMUSCULAR; INTRAVENOUS at 10:37

## 2025-05-28 RX ADMIN — DICYCLOMINE HYDROCHLORIDE 20 MG: 10 INJECTION, SOLUTION INTRAMUSCULAR at 11:46

## 2025-05-28 RX ADMIN — DROPERIDOL 0.62 MG: 2.5 INJECTION, SOLUTION INTRAMUSCULAR; INTRAVENOUS at 11:47

## 2025-05-28 RX ADMIN — Medication 200 ML: at 11:47

## 2025-05-28 RX ADMIN — DIPHENHYDRAMINE HYDROCHLORIDE 50 MG: 50 INJECTION, SOLUTION INTRAMUSCULAR; INTRAVENOUS at 10:36

## 2025-05-28 RX ADMIN — HALOPERIDOL LACTATE 3 MG: 5 INJECTION, SOLUTION INTRAMUSCULAR at 10:34

## 2025-05-28 RX ADMIN — SODIUM CHLORIDE 1000 ML: 900 INJECTION, SOLUTION INTRAVENOUS at 10:34

## 2025-05-28 RX ADMIN — PANTOPRAZOLE SODIUM 40 MG: 40 INJECTION, POWDER, LYOPHILIZED, FOR SOLUTION INTRAVENOUS at 11:46

## 2025-05-28 SDOH — HEALTH STABILITY: MENTAL HEALTH: BEHAVIORS/MOOD: ANXIOUS;HOSTILE;INAPPROPRIATE;IRRITABLE

## 2025-05-28 SDOH — HEALTH STABILITY: MENTAL HEALTH: BEHAVIORAL HEALTH(WDL): EXCEPTIONS TO WDL

## 2025-05-28 ASSESSMENT — COLUMBIA-SUICIDE SEVERITY RATING SCALE - C-SSRS
1. IN THE PAST MONTH, HAVE YOU WISHED YOU WERE DEAD OR WISHED YOU COULD GO TO SLEEP AND NOT WAKE UP?: NO
2. HAVE YOU ACTUALLY HAD ANY THOUGHTS OF KILLING YOURSELF?: NO
6. HAVE YOU EVER DONE ANYTHING, STARTED TO DO ANYTHING, OR PREPARED TO DO ANYTHING TO END YOUR LIFE?: NO

## 2025-05-28 ASSESSMENT — PAIN DESCRIPTION - FREQUENCY: FREQUENCY: CONSTANT/CONTINUOUS

## 2025-05-28 ASSESSMENT — PAIN DESCRIPTION - PROGRESSION: CLINICAL_PROGRESSION: NOT CHANGED

## 2025-05-28 ASSESSMENT — PAIN - FUNCTIONAL ASSESSMENT
PAIN_FUNCTIONAL_ASSESSMENT: 0-10
PAIN_FUNCTIONAL_ASSESSMENT: 0-10

## 2025-05-28 ASSESSMENT — PAIN DESCRIPTION - PAIN TYPE: TYPE: ACUTE PAIN

## 2025-05-28 ASSESSMENT — PAIN DESCRIPTION - ONSET: ONSET: SUDDEN

## 2025-05-28 ASSESSMENT — PAIN DESCRIPTION - DESCRIPTORS
DESCRIPTORS: SHARP;BURNING
DESCRIPTORS: BURNING;SHARP

## 2025-05-28 ASSESSMENT — PAIN SCALES - GENERAL
PAINLEVEL_OUTOF10: 8
PAINLEVEL_OUTOF10: 10 - WORST POSSIBLE PAIN

## 2025-05-28 ASSESSMENT — PAIN DESCRIPTION - LOCATION
LOCATION: ABDOMEN
LOCATION: ABDOMEN

## 2025-05-28 NOTE — ED PROVIDER NOTES
HPI   Chief Complaint   Patient presents with    Abdominal Pain     I started haVing sharp burning abd pain around my belly button and I also have diarrhea and nausea and vomiting        60-year-old male presents for chief complaint of abdominal pain for the past 3 days.  He was hospitalized 2 days ago for similar complaint had acute renal insufficiency suspected related to dehydration from nausea and vomiting was admitted for observation overnight, rehydrated and improved, discharged yesterday.  Patient states he is still having vomiting and diffuse abdominal pain and cramping.  He is having diarrhea as well.  States symptoms initially began 5 days ago with cough and congestion which she is still having then progressed into the abdominal pain vomiting and diarrhea.  No fever.  He has had prior partial colectomy and cholecystectomy.  No chest pain or shortness of breath.  Had a chest x-ray as well as CT abdomen pelvis 2 days ago that showed no acute process.      History provided by:  Medical records and patient          Patient History   Medical History[1]  Surgical History[2]  Family History[3]  Social History[4]    Physical Exam   ED Triage Vitals [05/28/25 0957]   Temperature Heart Rate Respirations BP   36.7 °C (98 °F) (!) 112 18 (!) 180/109      Pulse Ox Temp Source Heart Rate Source Patient Position   95 % Temporal Monitor Sitting      BP Location FiO2 (%)     Left arm --       Physical Exam  Vitals and nursing note reviewed.     General: Vitals reviewed. Awake, alert, well-developed, well-nourished, crying and moaning in pain, somewhat hysterical, moist cough noted  HEENT: NC/AT, PERRL, MMM  Neck: Supple, trachea midline  Respiratory: No respiratory distress, lungs clear to auscultation bilaterally, no wheezes, rhonchi, or rales  CV: Tachycardic rate and regular rhythm, no murmur/gallop/rubs  Abdomen/GI: Soft, non-tender, non-distended, no rebound, guarding, or rigidity, normal bowel sounds, well-healed  midline surgical scar present  Extremities: Moving all extremities, no deformities  Neuro: A/Ox3, normal speech  Skin: Warm, dry. No rashes identified      ED Course & MDM   ED Course as of 05/28/25 1239   Wed May 28, 2025   1015 EKG on my independent interpretation: Sinus tachycardia 109 bpm, normal axis, normal intervals, no acute ST or T wave abnormalities when compared to prior EKG 2/10/2025 [MARISABEL]   1108 Patient feeling improved, resting comfortably [MARISABEL]      ED Course User Index  [MARISABEL] Mary Pearson MD         Diagnoses as of 05/28/25 1239   Abdominal pain, generalized   Nausea and vomiting, unspecified vomiting type   Diarrhea of presumed infectious origin   Nonspecific syndrome suggestive of viral illness                 No data recorded     Manny Coma Scale Score: 15 (05/28/25 0954 : Adriano Garcia, EMT)                           Medical Decision Making  Patient presents with diffuse abdominal pain, nausea and vomiting, diarrhea along with URI symptoms in total for 5 days 3 days of abdominal pain vomiting and diarrhea.  I suspect given he had a negative CT 2 days ago the associated abdominal pain, vomiting, diarrhea and URI symptoms may likely be viral in etiology.  Considerations include but not limited to diverticulitis, appendicitis, pancreatitis, colitis, ischemic bowel, urinary tract infection . Low suspicion for ruptured AAA/dissection, bowel obstruction.  No pain out of proportion thus low suspicion for ischemic bowel.   Will repeat labs and treat symptomatically.  Patient treated symptomatically with improvement.  Labs without significant abnormality.  Mild leukocytosis nonspecific.  Given lack of focality and CT reviewed from 2 days ago with no acute finding do not feel repeat CT abdomen and pelvis is indicated although was considered.  I do suspect this is most likely viral in etiology given his other symptoms however cannot exclude that he is early in the course of process that cannot yet be  identified at this time do feel appropriate for continued supportive care on an outpatient basis with strict return precautions.  He does have Zofran prescribed already will discharge with prescription for Pepcid, dicyclomine and refer to GI for follow-up as well.    Discussed that more than 50% of abdominal pain that comes to the Emergency Department goes undiagnosed and that there were no emergent findings in workup today. Discussed that certain diagnoese such as appendicitis, colitis, diverticulitis, cholelithiasis or other illnesses are undetectable early on in their course and may not be seen on the first visit.  I recommended abdominal re-examination in 12-24 hours if  symptoms are not significantly improved, sooner if worsening.       Amount and/or Complexity of Data Reviewed  External Data Reviewed: notes.     Details: 5/27/2025 internal medicine hospitalization discharge summary per note reviewed showing:  Hospital Course   Admitted for further observation for N/V, abd pain and mild DELFINO. He was given multiple medications to control his symptoms, haldol helped him the best. He has significant anxiety. CT abd/pelvis with no acute findings. He was given IVF with improvement in his renal function. Dr. Gaytan saw the patient, discussed with him and has cleared him fro DC with labs in 1 week. He will follow up with GI outpatient as there is no inpatient coverage today. His nausea is resolved and pain is improved. He is tolerating diet and drinking without issue. Cleared for DC today and instructed to call GI office for appointment today.        Labs: ordered. Decision-making details documented in ED Course.        Procedure  Procedures       [1] History reviewed. No pertinent past medical history.  [2] History reviewed. No pertinent surgical history.  [3]   Family History  Problem Relation Name Age of Onset    Cancer Mother     [4]   Social History  Tobacco Use    Smoking status: Never    Smokeless tobacco: Never    Substance Use Topics    Alcohol use: Never    Drug use: Never        Mary Pearson MD  05/28/25 1079

## 2025-05-28 NOTE — DISCHARGE INSTRUCTIONS
Your symptoms are likely due to a viral infection that may last 7-10 days.  Your body will fight off this infection on its own, and the typical care is to treat the symptoms during this time.  Tylenol or ibuprofen as needed for pain or fever.  You may also take over-the-counter medications (or the prescribed medications if applicable) as needed for diarrhea or other symptoms you may have.  Small sips of fluid more frequently or better tolerate.  Eat a bland diet until symptoms improving then you may advance as tolerated to your standard diet.  Please return immediately to the emergency department if you develop any new or worsening symptoms such as a fever lasting more than 5 days, increasing abdominal pain or inability to stay hydrated with oral fluid intake.    More than 50% of abdominal pain that comes to the Emergency Department goes undiagnosed and that there were no emergent findings in your workup today.  If your symptoms get worse, developed high fever, or persistent vomiting you should come back to the Emergency Department. Often times an appendicitis, colitis, diverticulitis, cholelithiasis and many other such illnesses are undetectable early on in their course and will not be seen on the first emergency department visit.  I recommend that you be re-examined in 12-24 hours if your symptoms are not significantly improved.

## 2025-06-01 ENCOUNTER — APPOINTMENT (OUTPATIENT)
Dept: RADIOLOGY | Facility: HOSPITAL | Age: 60
DRG: 392 | End: 2025-06-01
Payer: COMMERCIAL

## 2025-06-01 ENCOUNTER — APPOINTMENT (OUTPATIENT)
Dept: CARDIOLOGY | Facility: HOSPITAL | Age: 60
DRG: 392 | End: 2025-06-01
Payer: COMMERCIAL

## 2025-06-01 ENCOUNTER — HOSPITAL ENCOUNTER (INPATIENT)
Facility: HOSPITAL | Age: 60
DRG: 392 | End: 2025-06-01
Attending: STUDENT IN AN ORGANIZED HEALTH CARE EDUCATION/TRAINING PROGRAM | Admitting: INTERNAL MEDICINE
Payer: COMMERCIAL

## 2025-06-01 DIAGNOSIS — E86.0 DEHYDRATION: ICD-10-CM

## 2025-06-01 DIAGNOSIS — R11.2 NAUSEA AND VOMITING, UNSPECIFIED VOMITING TYPE: ICD-10-CM

## 2025-06-01 DIAGNOSIS — K20.90 ESOPHAGITIS: ICD-10-CM

## 2025-06-01 DIAGNOSIS — F41.1 GAD (GENERALIZED ANXIETY DISORDER): ICD-10-CM

## 2025-06-01 DIAGNOSIS — R10.84 GENERALIZED ABDOMINAL PAIN: ICD-10-CM

## 2025-06-01 DIAGNOSIS — K92.2 UPPER GASTROINTESTINAL HEMORRHAGE: Primary | ICD-10-CM

## 2025-06-01 PROBLEM — K57.92 DIVERTICULITIS: Status: ACTIVE | Noted: 2025-06-01

## 2025-06-01 PROBLEM — L02.219 CELLULITIS AND ABSCESS OF TRUNK: Status: ACTIVE | Noted: 2025-06-01

## 2025-06-01 PROBLEM — F41.9 ANXIETY: Status: ACTIVE | Noted: 2025-06-01

## 2025-06-01 PROBLEM — L03.311 CELLULITIS OF ABDOMINAL WALL: Status: ACTIVE | Noted: 2025-06-01

## 2025-06-01 PROBLEM — F43.0 STRESS DISORDER, ACUTE: Status: ACTIVE | Noted: 2025-06-01

## 2025-06-01 PROBLEM — M10.9 GOUTY ARTHRITIS OF RIGHT ANKLE: Status: ACTIVE | Noted: 2025-06-01

## 2025-06-01 PROBLEM — K81.1 CHRONIC CHOLECYSTITIS: Status: ACTIVE | Noted: 2025-06-01

## 2025-06-01 PROBLEM — E78.6 HDL DEFICIENCY: Status: ACTIVE | Noted: 2025-06-01

## 2025-06-01 PROBLEM — K21.9 GASTRO-ESOPHAGEAL REFLUX DISEASE WITHOUT ESOPHAGITIS: Status: ACTIVE | Noted: 2025-06-01

## 2025-06-01 PROBLEM — L03.319 CELLULITIS AND ABSCESS OF TRUNK: Status: ACTIVE | Noted: 2025-06-01

## 2025-06-01 PROBLEM — F32.2 CURRENT SEVERE EPISODE OF MAJOR DEPRESSIVE DISORDER WITHOUT PSYCHOTIC FEATURES (MULTI): Status: ACTIVE | Noted: 2025-06-01

## 2025-06-01 PROBLEM — R51.9 HEADACHE: Status: ACTIVE | Noted: 2025-06-01

## 2025-06-01 PROBLEM — G47.00 INSOMNIA: Status: ACTIVE | Noted: 2025-06-01

## 2025-06-01 LAB
ALBUMIN SERPL BCP-MCNC: 4.8 G/DL (ref 3.4–5)
ALP SERPL-CCNC: 41 U/L (ref 33–136)
ALT SERPL W P-5'-P-CCNC: 97 U/L (ref 10–52)
AMPHETAMINES UR QL SCN: ABNORMAL
ANION GAP SERPL CALCULATED.3IONS-SCNC: 16 MMOL/L (ref 10–20)
APPEARANCE UR: CLEAR
AST SERPL W P-5'-P-CCNC: 41 U/L (ref 9–39)
ATRIAL RATE: 109 BPM
BARBITURATES UR QL SCN: ABNORMAL
BASOPHILS # BLD AUTO: 0.05 X10*3/UL (ref 0–0.1)
BASOPHILS NFR BLD AUTO: 0.4 %
BENZODIAZ UR QL SCN: ABNORMAL
BILIRUB SERPL-MCNC: 1.6 MG/DL (ref 0–1.2)
BILIRUB UR STRIP.AUTO-MCNC: NEGATIVE MG/DL
BUN SERPL-MCNC: 21 MG/DL (ref 6–23)
BZE UR QL SCN: ABNORMAL
CALCIUM SERPL-MCNC: 9.5 MG/DL (ref 8.6–10.3)
CANNABINOIDS UR QL SCN: ABNORMAL
CHLORIDE SERPL-SCNC: 100 MMOL/L (ref 98–107)
CO2 SERPL-SCNC: 23 MMOL/L (ref 21–32)
COLOR UR: YELLOW
CREAT SERPL-MCNC: 0.95 MG/DL (ref 0.5–1.3)
EGFRCR SERPLBLD CKD-EPI 2021: >90 ML/MIN/1.73M*2
EOSINOPHIL # BLD AUTO: 0.1 X10*3/UL (ref 0–0.7)
EOSINOPHIL NFR BLD AUTO: 0.8 %
ERYTHROCYTE [DISTWIDTH] IN BLOOD BY AUTOMATED COUNT: 11.8 % (ref 11.5–14.5)
FENTANYL+NORFENTANYL UR QL SCN: ABNORMAL
GLUCOSE SERPL-MCNC: 123 MG/DL (ref 74–99)
GLUCOSE UR STRIP.AUTO-MCNC: NORMAL MG/DL
HCT VFR BLD AUTO: 44 % (ref 41–52)
HGB BLD-MCNC: 15.4 G/DL (ref 13.5–17.5)
IMM GRANULOCYTES # BLD AUTO: 0.07 X10*3/UL (ref 0–0.7)
IMM GRANULOCYTES NFR BLD AUTO: 0.5 % (ref 0–0.9)
KETONES UR STRIP.AUTO-MCNC: ABNORMAL MG/DL
LACTATE SERPL-SCNC: 1.5 MMOL/L (ref 0.4–2)
LEUKOCYTE ESTERASE UR QL STRIP.AUTO: NEGATIVE
LIPASE SERPL-CCNC: 38 U/L (ref 9–82)
LYMPHOCYTES # BLD AUTO: 3.21 X10*3/UL (ref 1.2–4.8)
LYMPHOCYTES NFR BLD AUTO: 24.7 %
MCH RBC QN AUTO: 28.8 PG (ref 26–34)
MCHC RBC AUTO-ENTMCNC: 35 G/DL (ref 32–36)
MCV RBC AUTO: 82 FL (ref 80–100)
METHADONE UR QL SCN: ABNORMAL
MONOCYTES # BLD AUTO: 1.27 X10*3/UL (ref 0.1–1)
MONOCYTES NFR BLD AUTO: 9.8 %
MUCOUS THREADS #/AREA URNS AUTO: NORMAL /LPF
NEUTROPHILS # BLD AUTO: 8.32 X10*3/UL (ref 1.2–7.7)
NEUTROPHILS NFR BLD AUTO: 63.8 %
NITRITE UR QL STRIP.AUTO: NEGATIVE
NRBC BLD-RTO: 0 /100 WBCS (ref 0–0)
OPIATES UR QL SCN: ABNORMAL
OXYCODONE+OXYMORPHONE UR QL SCN: ABNORMAL
P AXIS: 81 DEGREES
P OFFSET: 205 MS
P ONSET: 158 MS
PCP UR QL SCN: ABNORMAL
PH UR STRIP.AUTO: 6 [PH]
PLATELET # BLD AUTO: 362 X10*3/UL (ref 150–450)
POTASSIUM SERPL-SCNC: 3.9 MMOL/L (ref 3.5–5.3)
PR INTERVAL: 136 MS
PROT SERPL-MCNC: 7.7 G/DL (ref 6.4–8.2)
PROT UR STRIP.AUTO-MCNC: ABNORMAL MG/DL
Q ONSET: 226 MS
QRS COUNT: 17 BEATS
QRS DURATION: 74 MS
QT INTERVAL: 332 MS
QTC CALCULATION(BAZETT): 447 MS
QTC FREDERICIA: 405 MS
R AXIS: 7 DEGREES
RBC # BLD AUTO: 5.35 X10*6/UL (ref 4.5–5.9)
RBC # UR STRIP.AUTO: NEGATIVE MG/DL
RBC #/AREA URNS AUTO: NORMAL /HPF
SODIUM SERPL-SCNC: 135 MMOL/L (ref 136–145)
SP GR UR STRIP.AUTO: 1.03
T AXIS: 77 DEGREES
T OFFSET: 392 MS
UROBILINOGEN UR STRIP.AUTO-MCNC: ABNORMAL MG/DL
VENTRICULAR RATE: 109 BPM
WBC # BLD AUTO: 13 X10*3/UL (ref 4.4–11.3)
WBC #/AREA URNS AUTO: NORMAL /HPF

## 2025-06-01 PROCEDURE — 80053 COMPREHEN METABOLIC PANEL: CPT | Performed by: STUDENT IN AN ORGANIZED HEALTH CARE EDUCATION/TRAINING PROGRAM

## 2025-06-01 PROCEDURE — 2500000004 HC RX 250 GENERAL PHARMACY W/ HCPCS (ALT 636 FOR OP/ED): Performed by: NURSE PRACTITIONER

## 2025-06-01 PROCEDURE — 81001 URINALYSIS AUTO W/SCOPE: CPT | Performed by: STUDENT IN AN ORGANIZED HEALTH CARE EDUCATION/TRAINING PROGRAM

## 2025-06-01 PROCEDURE — 80307 DRUG TEST PRSMV CHEM ANLYZR: CPT | Performed by: PHYSICIAN ASSISTANT

## 2025-06-01 PROCEDURE — A9698 NON-RAD CONTRAST MATERIALNOC: HCPCS | Performed by: STUDENT IN AN ORGANIZED HEALTH CARE EDUCATION/TRAINING PROGRAM

## 2025-06-01 PROCEDURE — 96361 HYDRATE IV INFUSION ADD-ON: CPT

## 2025-06-01 PROCEDURE — 93005 ELECTROCARDIOGRAM TRACING: CPT

## 2025-06-01 PROCEDURE — 2500000004 HC RX 250 GENERAL PHARMACY W/ HCPCS (ALT 636 FOR OP/ED): Performed by: STUDENT IN AN ORGANIZED HEALTH CARE EDUCATION/TRAINING PROGRAM

## 2025-06-01 PROCEDURE — 96372 THER/PROPH/DIAG INJ SC/IM: CPT | Performed by: STUDENT IN AN ORGANIZED HEALTH CARE EDUCATION/TRAINING PROGRAM

## 2025-06-01 PROCEDURE — 99285 EMERGENCY DEPT VISIT HI MDM: CPT | Mod: 25 | Performed by: STUDENT IN AN ORGANIZED HEALTH CARE EDUCATION/TRAINING PROGRAM

## 2025-06-01 PROCEDURE — 74177 CT ABD & PELVIS W/CONTRAST: CPT

## 2025-06-01 PROCEDURE — 99223 1ST HOSP IP/OBS HIGH 75: CPT | Performed by: NURSE PRACTITIONER

## 2025-06-01 PROCEDURE — 2500000001 HC RX 250 WO HCPCS SELF ADMINISTERED DRUGS (ALT 637 FOR MEDICARE OP): Performed by: NURSE PRACTITIONER

## 2025-06-01 PROCEDURE — 83690 ASSAY OF LIPASE: CPT | Performed by: STUDENT IN AN ORGANIZED HEALTH CARE EDUCATION/TRAINING PROGRAM

## 2025-06-01 PROCEDURE — 83605 ASSAY OF LACTIC ACID: CPT | Performed by: STUDENT IN AN ORGANIZED HEALTH CARE EDUCATION/TRAINING PROGRAM

## 2025-06-01 PROCEDURE — 36415 COLL VENOUS BLD VENIPUNCTURE: CPT | Performed by: STUDENT IN AN ORGANIZED HEALTH CARE EDUCATION/TRAINING PROGRAM

## 2025-06-01 PROCEDURE — 96375 TX/PRO/DX INJ NEW DRUG ADDON: CPT

## 2025-06-01 PROCEDURE — 2550000001 HC RX 255 CONTRASTS: Performed by: STUDENT IN AN ORGANIZED HEALTH CARE EDUCATION/TRAINING PROGRAM

## 2025-06-01 PROCEDURE — 2500000004 HC RX 250 GENERAL PHARMACY W/ HCPCS (ALT 636 FOR OP/ED): Mod: JZ | Performed by: PHYSICIAN ASSISTANT

## 2025-06-01 PROCEDURE — 85025 COMPLETE CBC W/AUTO DIFF WBC: CPT | Performed by: STUDENT IN AN ORGANIZED HEALTH CARE EDUCATION/TRAINING PROGRAM

## 2025-06-01 PROCEDURE — 96376 TX/PRO/DX INJ SAME DRUG ADON: CPT

## 2025-06-01 PROCEDURE — 74177 CT ABD & PELVIS W/CONTRAST: CPT | Performed by: RADIOLOGY

## 2025-06-01 PROCEDURE — 2500000001 HC RX 250 WO HCPCS SELF ADMINISTERED DRUGS (ALT 637 FOR MEDICARE OP): Performed by: REGISTERED NURSE

## 2025-06-01 PROCEDURE — 1200000002 HC GENERAL ROOM WITH TELEMETRY DAILY

## 2025-06-01 PROCEDURE — 93010 ELECTROCARDIOGRAM REPORT: CPT | Performed by: INTERNAL MEDICINE

## 2025-06-01 PROCEDURE — 96374 THER/PROPH/DIAG INJ IV PUSH: CPT | Mod: 59

## 2025-06-01 RX ORDER — FAMOTIDINE 10 MG/ML
20 INJECTION, SOLUTION INTRAVENOUS 2 TIMES DAILY
Status: DISCONTINUED | OUTPATIENT
Start: 2025-06-01 | End: 2025-06-02 | Stop reason: HOSPADM

## 2025-06-01 RX ORDER — ONDANSETRON HYDROCHLORIDE 2 MG/ML
4 INJECTION, SOLUTION INTRAVENOUS EVERY 8 HOURS PRN
Status: DISCONTINUED | OUTPATIENT
Start: 2025-06-01 | End: 2025-06-02 | Stop reason: HOSPADM

## 2025-06-01 RX ORDER — ACETAMINOPHEN 650 MG/1
650 SUPPOSITORY RECTAL EVERY 4 HOURS PRN
Status: DISCONTINUED | OUTPATIENT
Start: 2025-06-01 | End: 2025-06-02 | Stop reason: HOSPADM

## 2025-06-01 RX ORDER — PANTOPRAZOLE SODIUM 40 MG/10ML
80 INJECTION, POWDER, LYOPHILIZED, FOR SOLUTION INTRAVENOUS ONCE
Status: COMPLETED | OUTPATIENT
Start: 2025-06-01 | End: 2025-06-01

## 2025-06-01 RX ORDER — PROCHLORPERAZINE MALEATE 10 MG
10 TABLET ORAL EVERY 6 HOURS PRN
Status: DISCONTINUED | OUTPATIENT
Start: 2025-06-01 | End: 2025-06-02 | Stop reason: HOSPADM

## 2025-06-01 RX ORDER — ACETAMINOPHEN 325 MG/1
650 TABLET ORAL EVERY 4 HOURS PRN
Status: DISCONTINUED | OUTPATIENT
Start: 2025-06-01 | End: 2025-06-02 | Stop reason: HOSPADM

## 2025-06-01 RX ORDER — DROPERIDOL 2.5 MG/ML
0.62 INJECTION, SOLUTION INTRAMUSCULAR; INTRAVENOUS EVERY 6 HOURS PRN
Status: DISCONTINUED | OUTPATIENT
Start: 2025-06-01 | End: 2025-06-02 | Stop reason: HOSPADM

## 2025-06-01 RX ORDER — SODIUM CHLORIDE, SODIUM LACTATE, POTASSIUM CHLORIDE, CALCIUM CHLORIDE 600; 310; 30; 20 MG/100ML; MG/100ML; MG/100ML; MG/100ML
125 INJECTION, SOLUTION INTRAVENOUS CONTINUOUS
Status: DISCONTINUED | OUTPATIENT
Start: 2025-06-01 | End: 2025-06-02 | Stop reason: HOSPADM

## 2025-06-01 RX ORDER — ONDANSETRON HYDROCHLORIDE 2 MG/ML
4 INJECTION, SOLUTION INTRAVENOUS ONCE
Status: COMPLETED | OUTPATIENT
Start: 2025-06-01 | End: 2025-06-01

## 2025-06-01 RX ORDER — PROCHLORPERAZINE EDISYLATE 5 MG/ML
10 INJECTION INTRAMUSCULAR; INTRAVENOUS EVERY 6 HOURS PRN
Status: DISCONTINUED | OUTPATIENT
Start: 2025-06-01 | End: 2025-06-02 | Stop reason: HOSPADM

## 2025-06-01 RX ORDER — DROPERIDOL 2.5 MG/ML
0.62 INJECTION, SOLUTION INTRAMUSCULAR; INTRAVENOUS ONCE
Status: COMPLETED | OUTPATIENT
Start: 2025-06-01 | End: 2025-06-01

## 2025-06-01 RX ORDER — ACETAMINOPHEN 160 MG/5ML
650 SOLUTION ORAL EVERY 4 HOURS PRN
Status: DISCONTINUED | OUTPATIENT
Start: 2025-06-01 | End: 2025-06-02 | Stop reason: HOSPADM

## 2025-06-01 RX ORDER — PROCHLORPERAZINE 25 MG/1
25 SUPPOSITORY RECTAL EVERY 12 HOURS PRN
Status: DISCONTINUED | OUTPATIENT
Start: 2025-06-01 | End: 2025-06-02 | Stop reason: HOSPADM

## 2025-06-01 RX ORDER — ONDANSETRON 4 MG/1
4 TABLET, ORALLY DISINTEGRATING ORAL EVERY 8 HOURS PRN
Status: DISCONTINUED | OUTPATIENT
Start: 2025-06-01 | End: 2025-06-02 | Stop reason: HOSPADM

## 2025-06-01 RX ORDER — HEPARIN SODIUM 5000 [USP'U]/ML
5000 INJECTION, SOLUTION INTRAVENOUS; SUBCUTANEOUS EVERY 8 HOURS
Status: DISCONTINUED | OUTPATIENT
Start: 2025-06-01 | End: 2025-06-02 | Stop reason: HOSPADM

## 2025-06-01 RX ORDER — LORAZEPAM 0.5 MG/1
0.5 TABLET ORAL ONCE
Status: COMPLETED | OUTPATIENT
Start: 2025-06-01 | End: 2025-06-01

## 2025-06-01 RX ORDER — POLYETHYLENE GLYCOL 3350 17 G/17G
17 POWDER, FOR SOLUTION ORAL DAILY PRN
Status: DISCONTINUED | OUTPATIENT
Start: 2025-06-01 | End: 2025-06-02 | Stop reason: HOSPADM

## 2025-06-01 RX ORDER — PANTOPRAZOLE SODIUM 40 MG/10ML
40 INJECTION, POWDER, LYOPHILIZED, FOR SOLUTION INTRAVENOUS 2 TIMES DAILY
Status: DISCONTINUED | OUTPATIENT
Start: 2025-06-02 | End: 2025-06-02 | Stop reason: HOSPADM

## 2025-06-01 RX ORDER — DICYCLOMINE HYDROCHLORIDE 10 MG/ML
20 INJECTION INTRAMUSCULAR ONCE
Status: COMPLETED | OUTPATIENT
Start: 2025-06-01 | End: 2025-06-01

## 2025-06-01 RX ORDER — DICYCLOMINE HYDROCHLORIDE 10 MG/1
10 CAPSULE ORAL 4 TIMES DAILY
Status: DISCONTINUED | OUTPATIENT
Start: 2025-06-01 | End: 2025-06-02 | Stop reason: HOSPADM

## 2025-06-01 RX ORDER — BISACODYL 10 MG/1
10 SUPPOSITORY RECTAL DAILY PRN
Status: DISCONTINUED | OUTPATIENT
Start: 2025-06-01 | End: 2025-06-02 | Stop reason: HOSPADM

## 2025-06-01 RX ORDER — BISACODYL 5 MG
10 TABLET, DELAYED RELEASE (ENTERIC COATED) ORAL DAILY PRN
Status: DISCONTINUED | OUTPATIENT
Start: 2025-06-01 | End: 2025-06-02 | Stop reason: HOSPADM

## 2025-06-01 RX ORDER — FAMOTIDINE 20 MG/1
20 TABLET, FILM COATED ORAL 2 TIMES DAILY
Status: DISCONTINUED | OUTPATIENT
Start: 2025-06-01 | End: 2025-06-02 | Stop reason: HOSPADM

## 2025-06-01 RX ADMIN — LORAZEPAM 0.5 MG: 0.5 TABLET ORAL at 22:25

## 2025-06-01 RX ADMIN — IOHEXOL 500 ML: 12 SOLUTION ORAL at 17:54

## 2025-06-01 RX ADMIN — DROPERIDOL 0.62 MG: 2.5 INJECTION, SOLUTION INTRAMUSCULAR; INTRAVENOUS at 18:42

## 2025-06-01 RX ADMIN — SODIUM CHLORIDE 1000 ML: 900 INJECTION, SOLUTION INTRAVENOUS at 18:43

## 2025-06-01 RX ADMIN — HEPARIN SODIUM 5000 UNITS: 5000 INJECTION, SOLUTION INTRAVENOUS; SUBCUTANEOUS at 22:00

## 2025-06-01 RX ADMIN — IOHEXOL 75 ML: 350 INJECTION, SOLUTION INTRAVENOUS at 17:55

## 2025-06-01 RX ADMIN — DROPERIDOL 0.62 MG: 2.5 INJECTION, SOLUTION INTRAMUSCULAR; INTRAVENOUS at 17:04

## 2025-06-01 RX ADMIN — FAMOTIDINE 20 MG: 20 TABLET, FILM COATED ORAL at 22:00

## 2025-06-01 RX ADMIN — ONDANSETRON 4 MG: 2 INJECTION, SOLUTION INTRAMUSCULAR; INTRAVENOUS at 16:24

## 2025-06-01 RX ADMIN — DICYCLOMINE HYDROCHLORIDE 20 MG: 10 INJECTION, SOLUTION INTRAMUSCULAR at 16:25

## 2025-06-01 RX ADMIN — SODIUM CHLORIDE, SODIUM LACTATE, POTASSIUM CHLORIDE, AND CALCIUM CHLORIDE 1000 ML: 600; 310; 30; 20 INJECTION, SOLUTION INTRAVENOUS at 16:24

## 2025-06-01 RX ADMIN — DICYCLOMINE HYDROCHLORIDE 10 MG: 10 CAPSULE ORAL at 22:00

## 2025-06-01 RX ADMIN — PANTOPRAZOLE SODIUM 80 MG: 40 INJECTION, POWDER, LYOPHILIZED, FOR SOLUTION INTRAVENOUS at 19:17

## 2025-06-01 RX ADMIN — SODIUM CHLORIDE, SODIUM LACTATE, POTASSIUM CHLORIDE, AND CALCIUM CHLORIDE 125 ML/HR: 600; 310; 30; 20 INJECTION, SOLUTION INTRAVENOUS at 22:00

## 2025-06-01 SDOH — ECONOMIC STABILITY: FOOD INSECURITY: WITHIN THE PAST 12 MONTHS, YOU WORRIED THAT YOUR FOOD WOULD RUN OUT BEFORE YOU GOT THE MONEY TO BUY MORE.: NEVER TRUE

## 2025-06-01 SDOH — SOCIAL STABILITY: SOCIAL NETWORK: HOW OFTEN DO YOU GET TOGETHER WITH FRIENDS OR RELATIVES?: ONCE A WEEK

## 2025-06-01 SDOH — SOCIAL STABILITY: SOCIAL NETWORK: HOW OFTEN DO YOU ATTEND MEETINGS OF THE CLUBS OR ORGANIZATIONS YOU BELONG TO?: NEVER

## 2025-06-01 SDOH — SOCIAL STABILITY: SOCIAL INSECURITY: DOES ANYONE TRY TO KEEP YOU FROM HAVING/CONTACTING OTHER FRIENDS OR DOING THINGS OUTSIDE YOUR HOME?: NO

## 2025-06-01 SDOH — SOCIAL STABILITY: SOCIAL INSECURITY: ARE YOU OR HAVE YOU BEEN THREATENED OR ABUSED PHYSICALLY, EMOTIONALLY, OR SEXUALLY BY ANYONE?: NO

## 2025-06-01 SDOH — SOCIAL STABILITY: SOCIAL INSECURITY: WITHIN THE LAST YEAR, HAVE YOU BEEN HUMILIATED OR EMOTIONALLY ABUSED IN OTHER WAYS BY YOUR PARTNER OR EX-PARTNER?: NO

## 2025-06-01 SDOH — ECONOMIC STABILITY: INCOME INSECURITY: IN THE PAST 12 MONTHS HAS THE ELECTRIC, GAS, OIL, OR WATER COMPANY THREATENED TO SHUT OFF SERVICES IN YOUR HOME?: NO

## 2025-06-01 SDOH — HEALTH STABILITY: MENTAL HEALTH
DO YOU FEEL STRESS - TENSE, RESTLESS, NERVOUS, OR ANXIOUS, OR UNABLE TO SLEEP AT NIGHT BECAUSE YOUR MIND IS TROUBLED ALL THE TIME - THESE DAYS?: NOT AT ALL

## 2025-06-01 SDOH — SOCIAL STABILITY: SOCIAL INSECURITY: WITHIN THE LAST YEAR, HAVE YOU BEEN AFRAID OF YOUR PARTNER OR EX-PARTNER?: NO

## 2025-06-01 SDOH — SOCIAL STABILITY: SOCIAL NETWORK: HOW OFTEN DO YOU ATTEND CHURCH OR RELIGIOUS SERVICES?: NEVER

## 2025-06-01 SDOH — SOCIAL STABILITY: SOCIAL NETWORK
DO YOU BELONG TO ANY CLUBS OR ORGANIZATIONS SUCH AS CHURCH GROUPS, UNIONS, FRATERNAL OR ATHLETIC GROUPS, OR SCHOOL GROUPS?: NO

## 2025-06-01 SDOH — SOCIAL STABILITY: SOCIAL INSECURITY: DO YOU FEEL UNSAFE GOING BACK TO THE PLACE WHERE YOU ARE LIVING?: NO

## 2025-06-01 SDOH — SOCIAL STABILITY: SOCIAL INSECURITY: HAS ANYONE EVER THREATENED TO HURT YOUR FAMILY OR YOUR PETS?: NO

## 2025-06-01 SDOH — ECONOMIC STABILITY: FOOD INSECURITY: WITHIN THE PAST 12 MONTHS, THE FOOD YOU BOUGHT JUST DIDN'T LAST AND YOU DIDN'T HAVE MONEY TO GET MORE.: NEVER TRUE

## 2025-06-01 SDOH — SOCIAL STABILITY: SOCIAL INSECURITY: ARE YOU MARRIED, WIDOWED, DIVORCED, SEPARATED, NEVER MARRIED, OR LIVING WITH A PARTNER?: MARRIED

## 2025-06-01 SDOH — HEALTH STABILITY: PHYSICAL HEALTH: ON AVERAGE, HOW MANY MINUTES DO YOU ENGAGE IN EXERCISE AT THIS LEVEL?: 60 MIN

## 2025-06-01 SDOH — SOCIAL STABILITY: SOCIAL INSECURITY: WERE YOU ABLE TO COMPLETE ALL THE BEHAVIORAL HEALTH SCREENINGS?: YES

## 2025-06-01 SDOH — HEALTH STABILITY: PHYSICAL HEALTH: ON AVERAGE, HOW MANY DAYS PER WEEK DO YOU ENGAGE IN MODERATE TO STRENUOUS EXERCISE (LIKE A BRISK WALK)?: 7 DAYS

## 2025-06-01 SDOH — HEALTH STABILITY: MENTAL HEALTH: BEHAVIORS/MOOD: AGITATED;CRYING;RESTLESS

## 2025-06-01 SDOH — SOCIAL STABILITY: SOCIAL INSECURITY: ARE THERE ANY APPARENT SIGNS OF INJURIES/BEHAVIORS THAT COULD BE RELATED TO ABUSE/NEGLECT?: NO

## 2025-06-01 SDOH — HEALTH STABILITY: MENTAL HEALTH: BEHAVIORAL HEALTH(WDL): EXCEPTIONS TO WDL

## 2025-06-01 SDOH — SOCIAL STABILITY: SOCIAL INSECURITY: ABUSE: ADULT

## 2025-06-01 SDOH — SOCIAL STABILITY: SOCIAL NETWORK
IN A TYPICAL WEEK, HOW MANY TIMES DO YOU TALK ON THE PHONE WITH FAMILY, FRIENDS, OR NEIGHBORS?: MORE THAN THREE TIMES A WEEK

## 2025-06-01 SDOH — SOCIAL STABILITY: SOCIAL INSECURITY: HAVE YOU HAD THOUGHTS OF HARMING ANYONE ELSE?: NO

## 2025-06-01 SDOH — SOCIAL STABILITY: SOCIAL INSECURITY: DO YOU FEEL ANYONE HAS EXPLOITED OR TAKEN ADVANTAGE OF YOU FINANCIALLY OR OF YOUR PERSONAL PROPERTY?: NO

## 2025-06-01 SDOH — SOCIAL STABILITY: SOCIAL INSECURITY: HAVE YOU HAD ANY THOUGHTS OF HARMING ANYONE ELSE?: NO

## 2025-06-01 ASSESSMENT — ENCOUNTER SYMPTOMS
PALPITATIONS: 0
DIFFICULTY URINATING: 1
ABDOMINAL PAIN: 1
CHILLS: 0
NUMBNESS: 0
CONSTIPATION: 0
DIZZINESS: 0
UNEXPECTED WEIGHT CHANGE: 0
RHINORRHEA: 0
PSYCHIATRIC NEGATIVE: 1
APPETITE CHANGE: 1
HEMATOLOGIC/LYMPHATIC NEGATIVE: 1
DIARRHEA: 1
VOICE CHANGE: 0
SHORTNESS OF BREATH: 0
HEMATURIA: 0
SPEECH DIFFICULTY: 0
BACK PAIN: 0
WHEEZING: 0
WEAKNESS: 0
ABDOMINAL DISTENTION: 0
LIGHT-HEADEDNESS: 0
HEADACHES: 0
NAUSEA: 1
SORE THROAT: 0
VOMITING: 1
TROUBLE SWALLOWING: 0
TREMORS: 0
FEVER: 0
COUGH: 0

## 2025-06-01 ASSESSMENT — COGNITIVE AND FUNCTIONAL STATUS - GENERAL
MOBILITY SCORE: 24
PATIENT BASELINE BEDBOUND: NO
DAILY ACTIVITIY SCORE: 24

## 2025-06-01 ASSESSMENT — ACTIVITIES OF DAILY LIVING (ADL)
FEEDING YOURSELF: INDEPENDENT
GROOMING: INDEPENDENT
JUDGMENT_ADEQUATE_SAFELY_COMPLETE_DAILY_ACTIVITIES: YES
HEARING - RIGHT EAR: FUNCTIONAL
TOILETING: INDEPENDENT
LACK_OF_TRANSPORTATION: NO
DRESSING YOURSELF: INDEPENDENT
WALKS IN HOME: INDEPENDENT
ADEQUATE_TO_COMPLETE_ADL: YES
HEARING - LEFT EAR: FUNCTIONAL
PATIENT'S MEMORY ADEQUATE TO SAFELY COMPLETE DAILY ACTIVITIES?: YES
BATHING: INDEPENDENT

## 2025-06-01 ASSESSMENT — PAIN DESCRIPTION - PAIN TYPE: TYPE: ACUTE PAIN

## 2025-06-01 ASSESSMENT — LIFESTYLE VARIABLES
SKIP TO QUESTIONS 9-10: 1
HOW OFTEN DO YOU HAVE 6 OR MORE DRINKS ON ONE OCCASION: NEVER
AUDIT-C TOTAL SCORE: 0
HOW OFTEN DO YOU HAVE A DRINK CONTAINING ALCOHOL: NEVER
AUDIT-C TOTAL SCORE: 0
HOW MANY STANDARD DRINKS CONTAINING ALCOHOL DO YOU HAVE ON A TYPICAL DAY: PATIENT DOES NOT DRINK

## 2025-06-01 ASSESSMENT — PAIN DESCRIPTION - FREQUENCY: FREQUENCY: CONSTANT/CONTINUOUS

## 2025-06-01 ASSESSMENT — PAIN DESCRIPTION - PROGRESSION: CLINICAL_PROGRESSION: NOT CHANGED

## 2025-06-01 ASSESSMENT — PATIENT HEALTH QUESTIONNAIRE - PHQ9
2. FEELING DOWN, DEPRESSED OR HOPELESS: NOT AT ALL
1. LITTLE INTEREST OR PLEASURE IN DOING THINGS: NOT AT ALL
SUM OF ALL RESPONSES TO PHQ9 QUESTIONS 1 & 2: 0

## 2025-06-01 ASSESSMENT — PAIN SCALES - GENERAL
PAINLEVEL_OUTOF10: 0 - NO PAIN
PAINLEVEL_OUTOF10: 10 - WORST POSSIBLE PAIN

## 2025-06-01 ASSESSMENT — PAIN DESCRIPTION - LOCATION: LOCATION: ABDOMEN

## 2025-06-01 ASSESSMENT — PAIN DESCRIPTION - DESCRIPTORS: DESCRIPTORS: DULL

## 2025-06-01 ASSESSMENT — PAIN DESCRIPTION - ONSET: ONSET: SUDDEN

## 2025-06-01 ASSESSMENT — PAIN - FUNCTIONAL ASSESSMENT: PAIN_FUNCTIONAL_ASSESSMENT: 0-10

## 2025-06-01 NOTE — H&P
History Of Present Illness  Levy Flores is a 60 y.o. male with past medical history of nausea, vomiting, abdominal pain, upper GI bleed, GERD, chronic diverticulitis with remote colon resection, status post cholecystectomy, somatization disorder,   Patient presents to ED due to ongoing abdominal pain, intractable nausea, vomiting and diarrhea.  Patient said that he he was having nausea with vomiting and abdominal pain around his bellybutton for almost 10 days.  Patient's has 1 loose stool this morning and had 3-4 times emesis just bile.  Since he came to the ED no diarrhea or emesis noticed it.  Patient was in cruciated pain around his bellybutton.  Patient admits that he uses marijuana last time he used it was 3 weeks ago.  He does not drink alcohol for almost 10 years.  He had history of colectomy 17 years ago secondary to perforation.  Denies any fever or chills.  No neurological deficit no headache light headache or change in vision.  Denies any chest pain or shortness of breath in room air.    From the records, Patient was discharged on 5/27 and he was on the ED 5/28 with the same symptoms.  Came to the ED on 526 with the same symptoms     CT of the scan of the abdomen shows IMPRESSION:  1. No definite acute abdominal or pelvic process.  2. Mild nonspecific wall thickening of the rectum which could be  secondary to underdistention or mild proctitis.  3. Grade 1 anterolisthesis of L5 on S1 measuring 1.2 cm. Chronic L5  pars interarticularis defects.  4. Limited evaluation due to motion artifact and discontiguous  scanning.         Past Medical History  Medical History[1]    Surgical History  Surgical History[2]     Social History  He reports that he has never smoked. He has never used smokeless tobacco. He reports that he does not drink alcohol and does not use drugs.    Family History  Family History[3]     Allergies  Morphine    Review of Systems   Constitutional:  Positive for appetite change. Negative  "for chills, fever and unexpected weight change.   HENT:  Negative for rhinorrhea, sore throat, trouble swallowing and voice change.    Respiratory:  Negative for cough, shortness of breath and wheezing.    Cardiovascular:  Negative for chest pain, palpitations and leg swelling.   Gastrointestinal:  Positive for abdominal pain, diarrhea, nausea and vomiting. Negative for abdominal distention and constipation.   Genitourinary:  Positive for difficulty urinating. Negative for decreased urine volume and hematuria.   Musculoskeletal:  Negative for back pain.   Neurological:  Negative for dizziness, tremors, syncope, speech difficulty, weakness, light-headedness, numbness and headaches.   Hematological: Negative.    Psychiatric/Behavioral: Negative.          Physical Exam  Vitals and nursing note reviewed.   HENT:      Head: Normocephalic and atraumatic.      Nose: Nose normal. No congestion or rhinorrhea.   Eyes:      Extraocular Movements: Extraocular movements intact.      Pupils: Pupils are equal, round, and reactive to light.   Cardiovascular:      Rate and Rhythm: Normal rate and regular rhythm.   Pulmonary:      Effort: Pulmonary effort is normal.      Breath sounds: Normal breath sounds.   Abdominal:      General: Bowel sounds are normal. There is no distension.      Palpations: Abdomen is soft.      Tenderness: There is abdominal tenderness.   Musculoskeletal:         General: No tenderness. Normal range of motion.      Cervical back: Normal range of motion and neck supple.   Skin:     General: Skin is warm.   Neurological:      General: No focal deficit present.      Mental Status: He is oriented to person, place, and time.   Psychiatric:         Mood and Affect: Mood normal.          Last Recorded Vitals  Blood pressure (!) 151/111, pulse 100, temperature 37.1 °C (98.8 °F), temperature source Temporal, resp. rate 18, height 1.753 m (5' 9\"), weight 80.7 kg (178 lb), SpO2 98%.    Relevant Results  Reviewed Home " meds I reviewed labs and imaging  Medications Ordered Prior to Encounter[4]  Results for orders placed or performed during the hospital encounter of 06/01/25 (from the past 24 hours)   CBC and Auto Differential   Result Value Ref Range    WBC 13.0 (H) 4.4 - 11.3 x10*3/uL    nRBC 0.0 0.0 - 0.0 /100 WBCs    RBC 5.35 4.50 - 5.90 x10*6/uL    Hemoglobin 15.4 13.5 - 17.5 g/dL    Hematocrit 44.0 41.0 - 52.0 %    MCV 82 80 - 100 fL    MCH 28.8 26.0 - 34.0 pg    MCHC 35.0 32.0 - 36.0 g/dL    RDW 11.8 11.5 - 14.5 %    Platelets 362 150 - 450 x10*3/uL    Neutrophils % 63.8 40.0 - 80.0 %    Immature Granulocytes %, Automated 0.5 0.0 - 0.9 %    Lymphocytes % 24.7 13.0 - 44.0 %    Monocytes % 9.8 2.0 - 10.0 %    Eosinophils % 0.8 0.0 - 6.0 %    Basophils % 0.4 0.0 - 2.0 %    Neutrophils Absolute 8.32 (H) 1.20 - 7.70 x10*3/uL    Immature Granulocytes Absolute, Automated 0.07 0.00 - 0.70 x10*3/uL    Lymphocytes Absolute 3.21 1.20 - 4.80 x10*3/uL    Monocytes Absolute 1.27 (H) 0.10 - 1.00 x10*3/uL    Eosinophils Absolute 0.10 0.00 - 0.70 x10*3/uL    Basophils Absolute 0.05 0.00 - 0.10 x10*3/uL   Comprehensive metabolic panel   Result Value Ref Range    Glucose 123 (H) 74 - 99 mg/dL    Sodium 135 (L) 136 - 145 mmol/L    Potassium 3.9 3.5 - 5.3 mmol/L    Chloride 100 98 - 107 mmol/L    Bicarbonate 23 21 - 32 mmol/L    Anion Gap 16 10 - 20 mmol/L    Urea Nitrogen 21 6 - 23 mg/dL    Creatinine 0.95 0.50 - 1.30 mg/dL    eGFR >90 >60 mL/min/1.73m*2    Calcium 9.5 8.6 - 10.3 mg/dL    Albumin 4.8 3.4 - 5.0 g/dL    Alkaline Phosphatase 41 33 - 136 U/L    Total Protein 7.7 6.4 - 8.2 g/dL    AST 41 (H) 9 - 39 U/L    Bilirubin, Total 1.6 (H) 0.0 - 1.2 mg/dL    ALT 97 (H) 10 - 52 U/L   Lipase   Result Value Ref Range    Lipase 38 9 - 82 U/L   Lactate   Result Value Ref Range    Lactate 1.5 0.4 - 2.0 mmol/L   Urinalysis with Reflex Culture and Microscopic   Result Value Ref Range    Color, Urine Yellow Light-Yellow, Yellow, Dark-Yellow     Appearance, Urine Clear Clear    Specific Gravity, Urine 1.034 1.005 - 1.035    pH, Urine 6.0 5.0, 5.5, 6.0, 6.5, 7.0, 7.5, 8.0    Protein, Urine 10 (TRACE) NEGATIVE, 10 (TRACE), 20 (TRACE) mg/dL    Glucose, Urine Normal Normal mg/dL    Blood, Urine NEGATIVE NEGATIVE mg/dL    Ketones, Urine 10 (1+) (A) NEGATIVE mg/dL    Bilirubin, Urine NEGATIVE NEGATIVE mg/dL    Urobilinogen, Urine 3 (1+) (A) Normal mg/dL    Nitrite, Urine NEGATIVE NEGATIVE    Leukocyte Esterase, Urine NEGATIVE NEGATIVE   Urinalysis Microscopic   Result Value Ref Range    WBC, Urine NONE 1-5, NONE /HPF    RBC, Urine NONE NONE, 1-2, 3-5 /HPF    Mucus, Urine FEW Reference range not established. /LPF   Drug Screen, Urine   Result Value Ref Range    Amphetamine Screen, Urine Presumptive Negative Presumptive Negative    Barbiturate Screen, Urine Presumptive Negative Presumptive Negative    Benzodiazepines Screen, Urine Presumptive Positive (A) Presumptive Negative    Cannabinoid Screen, Urine Presumptive Positive (A) Presumptive Negative    Cocaine Metabolite Screen, Urine Presumptive Negative Presumptive Negative    Fentanyl Screen, Urine Presumptive Negative Presumptive Negative    Opiate Screen, Urine Presumptive Positive (A) Presumptive Negative    Oxycodone Screen, Urine Presumptive Positive (A) Presumptive Negative    PCP Screen, Urine Presumptive Negative Presumptive Negative    Methadone Screen, Urine Presumptive Negative Presumptive Negative     CT abdomen pelvis w IV and oral contrast  Result Date: 6/1/2025  Interpreted By:  Mack Wilson, STUDY: CT ABDOMEN PELVIS W IV AND ORAL CONTRAST;  6/1/2025 5:58 pm   INDICATION: Signs/Symptoms:Worsening abdominal pain.   COMPARISON: 05/26/2025   ACCESSION NUMBER(S): [id number]   ORDERING CLINICIAN: SUZETTE SHEEHAN   TECHNIQUE: Axial CT images of the abdomen and pelvis with coronal and sagittal reconstructed images obtained.  75 ML of Omnipaque 350 was administered intravenously without immediate  complication. Positive oral contrast was given.   FINDINGS: Limited evaluation due to motion artifact and discontiguous scanning.   LOWER CHEST: Small left Bochdalek hernia.   LIVER: Within normal limits.   BILE DUCTS: Normal caliber.   GALLBLADDER: Cholecystectomy.   PANCREAS: Within normal limits.   SPLEEN: Within normal limits.   ADRENALS: Within normal limits.   KIDNEYS, URETERS, and BLADDER: No hydronephrosis or renal calculi. Ureters are non-dilated. Urinary bladder within normal limits.   REPRODUCTIVE: No pelvic masses.   VESSELS: Minimal scattered atherosclerotic plaque. No abdominal aortic aneurysm.   RETROPERITONEUM and LYMPH NODES: No lymphadenopathy.   BOWEL: The stomach is unremarkable. Small bowel is non-dilated. Appendix is not definitively identified. No pericecal inflammation. Postsurgical change related to partial distal colonic resection and rectosigmoid anastomosis. There is mild nonspecific wall thickening of the rectum which could be secondary to underdistention or mild proctitis.   PERITONEUM: No ascites or free air. No fluid collection.   BODY WALL:  Mild ventral abdominal wall scarring.   MUSCULOSKELETAL: Moderate multilevel spinal degenerative change. There is grade 1 anterolisthesis of L5 on S1 measuring 1.2 cm. Chronic L5 pars interarticularis defects. Motion artifact substantially limits evaluation spine.       1. No definite acute abdominal or pelvic process. 2. Mild nonspecific wall thickening of the rectum which could be secondary to underdistention or mild proctitis. 3. Grade 1 anterolisthesis of L5 on S1 measuring 1.2 cm. Chronic L5 pars interarticularis defects. 4. Limited evaluation due to motion artifact and discontiguous scanning.   Signed by: Mack Wilson 6/1/2025 6:58 PM Dictation workstation:   [id number]      Admitted secondary to almost 10 days of nausea with vomiting, poor intake, abdominal pain, and diarrhea.  Started on PPI twice a day.  On clear liquids n.p.o.  after midnight for possible EGD tomorrow.  GI was consulted  Assessment & Plan  Nausea and vomiting, unspecified vomiting type  Start on PPI every 12 hours.  Give  antiemetic as needed  Start on IV fluids  Abdominal pain  Patient complains of pain around his bellybutton  mild elevation of elevated AST and ALT  Give Bentyl 4 times a day  CT of the abdomen see above  CMP in a.m.    DVT prophylaxis  On heparin sick subcutaneous      Adina Torsten, APRN-CNP         [1]   Past Medical History:  Diagnosis Date    Abdominal pain 05/27/2025    Acute kidney injury 05/27/2025    Diverticulitis of colon 03/27/2014    Elevated blood pressure reading 05/27/2025    Gastro-esophageal reflux disease without esophagitis 06/01/2025    Gouty arthritis of right ankle 06/01/2025    Nausea and vomiting 05/27/2025    Somatization disorder 05/27/2025   [2] History reviewed. No pertinent surgical history.  [3]   Family History  Problem Relation Name Age of Onset    Cancer Mother      No Known Problems Father     [4]   No current facility-administered medications on file prior to encounter.     Current Outpatient Medications on File Prior to Encounter   Medication Sig Dispense Refill    dicyclomine (Bentyl) 20 mg tablet Take 1 tablet (20 mg) by mouth 4 times a day before meals. 30 tablet 0    famotidine (Pepcid) 20 mg tablet Take 1 tablet (20 mg) by mouth once daily. 30 tablet 0    ibuprofen 800 mg tablet Take 1 tablet (800 mg) by mouth every 6 hours if needed.      omeprazole (PriLOSEC) 20 mg DR capsule Take 1 capsule (20 mg) by mouth once daily. Do not crush or chew. 30 capsule 0    ondansetron ODT (Zofran-ODT) 4 mg disintegrating tablet Dissolve 1 tablet (4 mg) in the mouth every 8 hours if needed for nausea or vomiting. 20 tablet 0

## 2025-06-01 NOTE — ED PROVIDER NOTES
HPI   Chief Complaint   Patient presents with    Vomiting     For the past 10 days I have been vomiting and have dull pain around my belly button        HPI  This is a 60-year-old male presenting to the emergency room for evaluation of ongoing abdominal discomfort, nausea and vomiting.  He was first seen on 5/26/2025 for the symptoms.  CT scan abdomen pelvis with IV contrast was negative, he was admitted for mild DELFINO.  Symptoms improve with IV fluids and he was tolerating diet and hospitalist was discharged home.  He presented 2 days ago on 5/28/2025 with similar symptoms.  Treated emergency department with IV medications with improvement of discharged home.  Patient does have an appoint with his gastroenterologist on Tuesday.  He has been having persistent nausea and vomiting abdominal pain.  He cannot eat or drink at home.  He does have a history of colectomy 17 years ago secondary to perforation.    Please see HPI for pertinent positive and negative ROS.       Patient History   Medical History[1]  Surgical History[2]  Family History[3]  Social History[4]    Physical Exam   ED Triage Vitals [06/01/25 1605]   Temperature Heart Rate Respirations BP   37.1 °C (98.8 °F) 100 18 (!) 151/111      Pulse Ox Temp Source Heart Rate Source Patient Position   98 % Temporal Monitor Sitting      BP Location FiO2 (%)     Left arm --       Physical Exam  GENERAL APPEARANCE: Awake and alert. No acute respiratory distress.   VITAL SIGNS: As per the nurses' triage record.  HEENT: Normocephalic, atraumatic.   NECK: Soft, nontender and supple  CHEST: Nontender to palpation. Clear to auscultation bilaterally. No rales, rhonchi, or wheezing. Symmetric rise and fall of chest wall.   HEART: Clear S1 and S2. Regular rate and rhythm. Strong and equal pulses in the extremities.  ABDOMEN: Soft, nontender, nondistended  MUSCULOSKELETAL: Full gross active range of motion. Ambulating on own with no acute difficulties  NEUROLOGICAL: Awake, alert  and oriented x 3. Motor power intact in the upper and lower extremities. Sensation is intact to light touch in the upper and lower extremities. Patient answering questions appropriately.   IMMUNOLOGICAL: No lymphatic streaking noted  DERMATOLOGIC: Warm and dry   PYSCH: Cooperative with appropriate mood and affect.    ED Course & MDM   ED Course as of 06/01/25 1912   Sun Jun 01, 2025 1829 EKG interpreted by myself independently, EKG shows a sinus rhythm, rate 82 bpm, ND interval 144, QRS 88, , QTc 457, patient has no ST elevation or depression, negative for acute MI. [CEDRIC]   1906 I did speak with Dr. Subramanian who would like patient admitted, and he will evaluate patient tomorrow here at [facility].  He will likely need EGD tomorrow.  Dr. Corcoran plans to add him on tomorrow.  Recommend IV pantoprazole and continuing IV fluids.  Recommend loading dose of IV pantoprazole at 80 mg and then 40 mg twice daily starting tomorrow. [SH]   1907 Last time patient smoked marijuana was 2 weeks ago [SH]      ED Course User Index  [CEDRIC] Donato Lopez DO  [SH] Lydia Nguyen PA-C         Diagnoses as of 06/01/25 1912   Nausea and vomiting, unspecified vomiting type   Generalized abdominal pain   Dehydration                 No data recorded     [place] Coma Scale Score: 15 (06/01/25 1617 : Kaleigh Estrella RN)                           Medical Decision Making  Parts of this chart have been completed using voice recognition software. Please excuse any errors of transcription.  My thought process and reason for plan has been formulated from the time that I saw the patient until the time of disposition and is not specific to one specific moment during their visit and furthermore my MDM encompasses this entire chart and not only this text box.      HPI: Detailed above.    Exam: A medically appropriate exam performed, outlined above, given the known history and presentation.    History obtained from: Patient and patient's  wife    Medications given during visit:  Medications   droperidol (Inapsine) injection 0.625 mg (0.625 mg intravenous Given 6/1/25 1704)   sodium chloride 0.9 % bolus 1,000 mL (1,000 mL intravenous New Bag 6/1/25 1843)   pantoprazole (Protonix) injection 80 mg (has no administration in time range)   ondansetron (Zofran) injection 4 mg (4 mg intravenous Given 6/1/25 1624)   lactated Ringer's bolus 1,000 mL (0 mL intravenous Stopped 6/1/25 1805)   dicyclomine (Bentyl) injection 20 mg (20 mg intramuscular Given 6/1/25 1625)   iohexol (OMNIPaque) 12 mg iodine/mL oral contrast 500 mL (500 mL oral Given 6/1/25 1754)   iohexol (OMNIPaque) 350 mg iodine/mL solution 75 mL (75 mL intravenous Given 6/1/25 1755)   droperidol (Inapsine) injection 0.625 mg (0.625 mg intravenous Given 6/1/25 1842)        Diagnostic/tests  Labs Reviewed   CBC WITH AUTO DIFFERENTIAL - Abnormal       Result Value    WBC 13.0 (*)     nRBC 0.0      RBC 5.35      Hemoglobin 15.4      Hematocrit 44.0      MCV 82      MCH 28.8      MCHC 35.0      RDW 11.8      Platelets 362      Neutrophils % 63.8      Immature Granulocytes %, Automated 0.5      Lymphocytes % 24.7      Monocytes % 9.8      Eosinophils % 0.8      Basophils % 0.4      Neutrophils Absolute 8.32 (*)     Immature Granulocytes Absolute, Automated 0.07      Lymphocytes Absolute 3.21      Monocytes Absolute 1.27 (*)     Eosinophils Absolute 0.10      Basophils Absolute 0.05     COMPREHENSIVE METABOLIC PANEL - Abnormal    Glucose 123 (*)     Sodium 135 (*)     Potassium 3.9      Chloride 100      Bicarbonate 23      Anion Gap 16      Urea Nitrogen 21      Creatinine 0.95      eGFR >90      Calcium 9.5      Albumin 4.8      Alkaline Phosphatase 41      Total Protein 7.7      AST 41 (*)     Bilirubin, Total 1.6 (*)     ALT 97 (*)    URINALYSIS WITH REFLEX CULTURE AND MICROSCOPIC - Abnormal    Color, Urine Yellow      Appearance, Urine Clear      Specific Gravity, Urine 1.034      pH, Urine 6.0       Protein, Urine 10 (TRACE)      Glucose, Urine Normal      Blood, Urine NEGATIVE      Ketones, Urine 10 (1+) (*)     Bilirubin, Urine NEGATIVE      Urobilinogen, Urine 3 (1+) (*)     Nitrite, Urine NEGATIVE      Leukocyte Esterase, Urine NEGATIVE     DRUG SCREEN,URINE - Abnormal    Amphetamine Screen, Urine Presumptive Negative      Barbiturate Screen, Urine Presumptive Negative      Benzodiazepines Screen, Urine Presumptive Positive (*)     Cannabinoid Screen, Urine Presumptive Positive (*)     Cocaine Metabolite Screen, Urine Presumptive Negative      Fentanyl Screen, Urine Presumptive Negative      Opiate Screen, Urine Presumptive Positive (*)     Oxycodone Screen, Urine Presumptive Positive (*)     PCP Screen, Urine Presumptive Negative      Methadone Screen, Urine Presumptive Negative      Narrative:     Drug screen results are presumptive and should not be used to assess   compliance with prescribed medication. Contact the performing [facility] laboratory   to add-on definitive confirmatory testing if clinically indicated.    Toxicology screening results are reported qualitatively. The concentration must   be greater than or equal to the cutoff to be reported as positive. The concentration   at which the screening test can detect an individual drug or metabolite varies.   The absence of expected drug(s) and/or drug metabolite(s) may indicate non-compliance,   inappropriate timing of specimen collection relative to drug administration, poor drug   absorption, diluted/adulterated urine, or limitations of testing. For medical purposes   only; not valid for forensic use.    Interpretive questions should be directed to the laboratory medical directors.   LIPASE - Normal    Lipase 38      Narrative:     Venipuncture immediately after or during the administration of Metamizole may lead to falsely low results. Testing should be performed immediately prior to Metamizole dosing.   LACTATE - Normal    Lactate 1.5       Narrative:     Venipuncture immediately after or during the administration of Metamizole may lead to falsely low results. Testing should be performed immediately prior to Metamizole dosing.   URINALYSIS WITH REFLEX CULTURE AND MICROSCOPIC    Narrative:     The following orders were created for panel order Urinalysis with Reflex Culture and Microscopic.  Procedure                               Abnormality         Status                     ---------                               -----------         ------                     Urinalysis with Reflex C...[[id number]]  Abnormal            Final result               Extra Urine Gray Tube[[id number]]                                                         Please view results for these tests on the individual orders.   EXTRA URINE GRAY TUBE   URINALYSIS MICROSCOPIC WITH REFLEX CULTURE    WBC, Urine NONE      RBC, Urine NONE      Mucus, Urine FEW        CT abdomen pelvis w IV and oral contrast   Final Result   1. No definite acute abdominal or pelvic process.   2. Mild nonspecific wall thickening of the rectum which could be   secondary to underdistention or mild proctitis.   3. Grade 1 anterolisthesis of L5 on S1 measuring 1.2 cm. Chronic L5   pars interarticularis defects.   4. Limited evaluation due to motion artifact and discontiguous   scanning.        Signed by: Mack Wilson 6/1/2025 6:58 PM   Dictation workstation:   [id number]           Considerations/further MDM:  Patient was seen in conjucntion with my supervising physician,  Dr. Nguyen. Please refer to his note.    Patient presents for intractable nausea and vomiting with diffuse abdominal discomfort.  CT abdomen pelvis with IV and oral contrast shows no definite acute pathologic findings in the abdomen or pelvis.     Labs show no elevation in lactate.  Lipase is not elevated at 38.  CMP unremarkable.  No evidence of DELFINO.  CBC shows a mild leukocytosis, likely secondary to dehydration.  Urine shows no  evidence of UTI, 1+ ketones again consistent with dehydration.  Patient was given 1 L IV normal saline, 1 L IV LR, IM Bentyl, IV Zofran, and 2 doses of IV droperidol and 0.65 mg each with some improvement of his discomfort.  However, the patient remains to be in a lot of discomfort.  I did speak with gastroenterology, Dr. Subramanian who would like patient admitted and he will see patient in the morning.  He would like to do EGD. Likely will add him on tomorrow. Patient was admitted in stable condition to accepting physician, Dr. Almendarez.     Procedure  Procedures       [1] History reviewed. No pertinent past medical history.  [2] History reviewed. No pertinent surgical history.  [3]   Family History  Problem Relation Name Age of Onset    Cancer Mother     [4]   Social History  Tobacco Use    Smoking status: Never    Smokeless tobacco: Never   Substance Use Topics    Alcohol use: Never    Drug use: Never        Lydia Nguyen PA-C  06/01/25 1912

## 2025-06-02 ENCOUNTER — ANESTHESIA (OUTPATIENT)
Dept: GASTROENTEROLOGY | Facility: HOSPITAL | Age: 60
End: 2025-06-02
Payer: COMMERCIAL

## 2025-06-02 ENCOUNTER — HOSPITAL ENCOUNTER (OUTPATIENT)
Dept: GASTROENTEROLOGY | Facility: HOSPITAL | Age: 60
Discharge: HOME | End: 2025-06-02
Payer: COMMERCIAL

## 2025-06-02 ENCOUNTER — APPOINTMENT (OUTPATIENT)
Dept: GASTROENTEROLOGY | Facility: HOSPITAL | Age: 60
DRG: 392 | End: 2025-06-02
Payer: COMMERCIAL

## 2025-06-02 ENCOUNTER — ANESTHESIA EVENT (OUTPATIENT)
Dept: GASTROENTEROLOGY | Facility: HOSPITAL | Age: 60
End: 2025-06-02
Payer: COMMERCIAL

## 2025-06-02 ENCOUNTER — APPOINTMENT (OUTPATIENT)
Dept: CARDIOLOGY | Facility: HOSPITAL | Age: 60
DRG: 392 | End: 2025-06-02
Payer: COMMERCIAL

## 2025-06-02 VITALS
DIASTOLIC BLOOD PRESSURE: 90 MMHG | WEIGHT: 183 LBS | HEIGHT: 69 IN | RESPIRATION RATE: 18 BRPM | SYSTOLIC BLOOD PRESSURE: 152 MMHG | HEART RATE: 67 BPM | OXYGEN SATURATION: 97 % | TEMPERATURE: 98.8 F | BODY MASS INDEX: 27.11 KG/M2

## 2025-06-02 VITALS
OXYGEN SATURATION: 97 % | TEMPERATURE: 99.3 F | WEIGHT: 183 LBS | BODY MASS INDEX: 27.11 KG/M2 | HEIGHT: 69 IN | SYSTOLIC BLOOD PRESSURE: 150 MMHG | RESPIRATION RATE: 18 BRPM | HEART RATE: 72 BPM | DIASTOLIC BLOOD PRESSURE: 97 MMHG

## 2025-06-02 VITALS
HEART RATE: 70 BPM | TEMPERATURE: 96.6 F | OXYGEN SATURATION: 96 % | DIASTOLIC BLOOD PRESSURE: 93 MMHG | SYSTOLIC BLOOD PRESSURE: 147 MMHG | RESPIRATION RATE: 11 BRPM

## 2025-06-02 DIAGNOSIS — K92.2 UPPER GASTROINTESTINAL HEMORRHAGE: ICD-10-CM

## 2025-06-02 LAB
ALBUMIN SERPL BCP-MCNC: 4 G/DL (ref 3.4–5)
ALP SERPL-CCNC: 38 U/L (ref 33–136)
ALT SERPL W P-5'-P-CCNC: 83 U/L (ref 10–52)
ANION GAP SERPL CALCULATED.3IONS-SCNC: 14 MMOL/L (ref 10–20)
AST SERPL W P-5'-P-CCNC: 41 U/L (ref 9–39)
ATRIAL RATE: 81 BPM
ATRIAL RATE: 82 BPM
BILIRUB SERPL-MCNC: 1.6 MG/DL (ref 0–1.2)
BUN SERPL-MCNC: 13 MG/DL (ref 6–23)
CALCIUM SERPL-MCNC: 9 MG/DL (ref 8.6–10.3)
CARDIAC TROPONIN I PNL SERPL HS: 14 NG/L (ref 0–20)
CHLORIDE SERPL-SCNC: 102 MMOL/L (ref 98–107)
CO2 SERPL-SCNC: 24 MMOL/L (ref 21–32)
CREAT SERPL-MCNC: 0.85 MG/DL (ref 0.5–1.3)
EGFRCR SERPLBLD CKD-EPI 2021: >90 ML/MIN/1.73M*2
ERYTHROCYTE [DISTWIDTH] IN BLOOD BY AUTOMATED COUNT: 11.8 % (ref 11.5–14.5)
GLUCOSE SERPL-MCNC: 98 MG/DL (ref 74–99)
HCT VFR BLD AUTO: 40.5 % (ref 41–52)
HGB BLD-MCNC: 14.1 G/DL (ref 13.5–17.5)
MCH RBC QN AUTO: 28.8 PG (ref 26–34)
MCHC RBC AUTO-ENTMCNC: 34.8 G/DL (ref 32–36)
MCV RBC AUTO: 83 FL (ref 80–100)
NRBC BLD-RTO: 0 /100 WBCS (ref 0–0)
P AXIS: 25 DEGREES
P AXIS: 42 DEGREES
P OFFSET: 210 MS
P OFFSET: 216 MS
P ONSET: 149 MS
P ONSET: 151 MS
PLATELET # BLD AUTO: 345 X10*3/UL (ref 150–450)
POTASSIUM SERPL-SCNC: 3.6 MMOL/L (ref 3.5–5.3)
PR INTERVAL: 144 MS
PR INTERVAL: 152 MS
PROT SERPL-MCNC: 6.6 G/DL (ref 6.4–8.2)
Q ONSET: 223 MS
Q ONSET: 225 MS
QRS COUNT: 13 BEATS
QRS COUNT: 14 BEATS
QRS DURATION: 82 MS
QRS DURATION: 88 MS
QT INTERVAL: 392 MS
QT INTERVAL: 392 MS
QTC CALCULATION(BAZETT): 455 MS
QTC CALCULATION(BAZETT): 457 MS
QTC FREDERICIA: 433 MS
QTC FREDERICIA: 435 MS
R AXIS: -18 DEGREES
R AXIS: 21 DEGREES
RBC # BLD AUTO: 4.9 X10*6/UL (ref 4.5–5.9)
SODIUM SERPL-SCNC: 136 MMOL/L (ref 136–145)
T AXIS: 29 DEGREES
T AXIS: 45 DEGREES
T OFFSET: 419 MS
T OFFSET: 421 MS
VENTRICULAR RATE: 81 BPM
VENTRICULAR RATE: 82 BPM
WBC # BLD AUTO: 10 X10*3/UL (ref 4.4–11.3)

## 2025-06-02 PROCEDURE — 2500000001 HC RX 250 WO HCPCS SELF ADMINISTERED DRUGS (ALT 637 FOR MEDICARE OP): Performed by: INTERNAL MEDICINE

## 2025-06-02 PROCEDURE — 2500000004 HC RX 250 GENERAL PHARMACY W/ HCPCS (ALT 636 FOR OP/ED): Performed by: NURSE PRACTITIONER

## 2025-06-02 PROCEDURE — 3700000002 HC GENERAL ANESTHESIA TIME - EACH INCREMENTAL 1 MINUTE

## 2025-06-02 PROCEDURE — 36415 COLL VENOUS BLD VENIPUNCTURE: CPT | Performed by: INTERNAL MEDICINE

## 2025-06-02 PROCEDURE — 99232 SBSQ HOSP IP/OBS MODERATE 35: CPT | Performed by: INTERNAL MEDICINE

## 2025-06-02 PROCEDURE — A43239 PR EDG TRANSORAL BIOPSY SINGLE/MULTIPLE: Performed by: ANESTHESIOLOGIST ASSISTANT

## 2025-06-02 PROCEDURE — 36415 COLL VENOUS BLD VENIPUNCTURE: CPT | Performed by: NURSE PRACTITIONER

## 2025-06-02 PROCEDURE — 99239 HOSP IP/OBS DSCHRG MGMT >30: CPT | Performed by: INTERNAL MEDICINE

## 2025-06-02 PROCEDURE — 93005 ELECTROCARDIOGRAM TRACING: CPT

## 2025-06-02 PROCEDURE — 3700000001 HC GENERAL ANESTHESIA TIME - INITIAL BASE CHARGE

## 2025-06-02 PROCEDURE — 7100000002 HC RECOVERY ROOM TIME - EACH INCREMENTAL 1 MINUTE

## 2025-06-02 PROCEDURE — 7100000001 HC RECOVERY ROOM TIME - INITIAL BASE CHARGE

## 2025-06-02 PROCEDURE — 2500000002 HC RX 250 W HCPCS SELF ADMINISTERED DRUGS (ALT 637 FOR MEDICARE OP, ALT 636 FOR OP/ED)

## 2025-06-02 PROCEDURE — 80053 COMPREHEN METABOLIC PANEL: CPT | Performed by: NURSE PRACTITIONER

## 2025-06-02 PROCEDURE — 0DB78ZX EXCISION OF STOMACH, PYLORUS, VIA NATURAL OR ARTIFICIAL OPENING ENDOSCOPIC, DIAGNOSTIC: ICD-10-PCS | Performed by: INTERNAL MEDICINE

## 2025-06-02 PROCEDURE — A43239 PR EDG TRANSORAL BIOPSY SINGLE/MULTIPLE: Performed by: ANESTHESIOLOGY

## 2025-06-02 PROCEDURE — 84484 ASSAY OF TROPONIN QUANT: CPT | Performed by: INTERNAL MEDICINE

## 2025-06-02 PROCEDURE — 2500000004 HC RX 250 GENERAL PHARMACY W/ HCPCS (ALT 636 FOR OP/ED): Performed by: ANESTHESIOLOGIST ASSISTANT

## 2025-06-02 PROCEDURE — 99223 1ST HOSP IP/OBS HIGH 75: CPT | Performed by: NURSE PRACTITIONER

## 2025-06-02 PROCEDURE — 43239 EGD BIOPSY SINGLE/MULTIPLE: CPT | Performed by: INTERNAL MEDICINE

## 2025-06-02 PROCEDURE — 90792 PSYCH DIAG EVAL W/MED SRVCS: CPT

## 2025-06-02 PROCEDURE — 85027 COMPLETE CBC AUTOMATED: CPT | Performed by: NURSE PRACTITIONER

## 2025-06-02 PROCEDURE — 2720000007 HC OR 272 NO HCPCS

## 2025-06-02 RX ORDER — LIDOCAINE HYDROCHLORIDE 10 MG/ML
0.1 INJECTION, SOLUTION EPIDURAL; INFILTRATION; INTRACAUDAL; PERINEURAL ONCE
OUTPATIENT
Start: 2025-06-02 | End: 2025-06-02

## 2025-06-02 RX ORDER — PANTOPRAZOLE SODIUM 40 MG/1
40 TABLET, DELAYED RELEASE ORAL 2 TIMES DAILY
Qty: 60 TABLET | Refills: 0 | Status: SHIPPED | OUTPATIENT
Start: 2025-06-02 | End: 2025-07-02

## 2025-06-02 RX ORDER — SERTRALINE HYDROCHLORIDE 25 MG/1
25 TABLET, FILM COATED ORAL DAILY
Status: DISCONTINUED | OUTPATIENT
Start: 2025-06-02 | End: 2025-06-02 | Stop reason: HOSPADM

## 2025-06-02 RX ORDER — OXYCODONE HYDROCHLORIDE 5 MG/1
5 TABLET ORAL EVERY 4 HOURS PRN
Refills: 0 | OUTPATIENT
Start: 2025-06-02

## 2025-06-02 RX ORDER — FENTANYL CITRATE 50 UG/ML
50 INJECTION, SOLUTION INTRAMUSCULAR; INTRAVENOUS EVERY 5 MIN PRN
Refills: 0 | OUTPATIENT
Start: 2025-06-02

## 2025-06-02 RX ORDER — FENTANYL CITRATE 50 UG/ML
25 INJECTION, SOLUTION INTRAMUSCULAR; INTRAVENOUS EVERY 5 MIN PRN
Refills: 0 | OUTPATIENT
Start: 2025-06-02

## 2025-06-02 RX ORDER — SERTRALINE HYDROCHLORIDE 25 MG/1
25 TABLET, FILM COATED ORAL DAILY
Qty: 30 TABLET | Refills: 0 | Status: SHIPPED | OUTPATIENT
Start: 2025-06-03

## 2025-06-02 RX ORDER — PROPOFOL 10 MG/ML
INJECTION, EMULSION INTRAVENOUS AS NEEDED
Status: DISCONTINUED | OUTPATIENT
Start: 2025-06-02 | End: 2025-06-02

## 2025-06-02 RX ORDER — ONDANSETRON HYDROCHLORIDE 2 MG/ML
4 INJECTION, SOLUTION INTRAVENOUS ONCE AS NEEDED
OUTPATIENT
Start: 2025-06-02

## 2025-06-02 RX ORDER — SERTRALINE HYDROCHLORIDE 25 MG/1
25 TABLET, FILM COATED ORAL DAILY
Status: DISCONTINUED | OUTPATIENT
Start: 2025-06-03 | End: 2025-06-02

## 2025-06-02 RX ORDER — LABETALOL HYDROCHLORIDE 5 MG/ML
5 INJECTION, SOLUTION INTRAVENOUS ONCE AS NEEDED
OUTPATIENT
Start: 2025-06-02

## 2025-06-02 RX ADMIN — PROPOFOL 250 MG: 10 INJECTION, EMULSION INTRAVENOUS at 11:42

## 2025-06-02 RX ADMIN — SODIUM CHLORIDE, SODIUM LACTATE, POTASSIUM CHLORIDE, AND CALCIUM CHLORIDE 125 ML/HR: 600; 310; 30; 20 INJECTION, SOLUTION INTRAVENOUS at 05:25

## 2025-06-02 RX ADMIN — DICYCLOMINE HYDROCHLORIDE 10 MG: 10 CAPSULE ORAL at 16:55

## 2025-06-02 RX ADMIN — FAMOTIDINE 20 MG: 10 INJECTION, SOLUTION INTRAVENOUS at 08:34

## 2025-06-02 RX ADMIN — SERTRALINE HYDROCHLORIDE 25 MG: 25 TABLET ORAL at 18:21

## 2025-06-02 RX ADMIN — PANTOPRAZOLE SODIUM 40 MG: 40 INJECTION, POWDER, FOR SOLUTION INTRAVENOUS at 08:34

## 2025-06-02 RX ADMIN — ONDANSETRON 4 MG: 2 INJECTION, SOLUTION INTRAMUSCULAR; INTRAVENOUS at 08:36

## 2025-06-02 RX ADMIN — HEPARIN SODIUM 5000 UNITS: 5000 INJECTION, SOLUTION INTRAVENOUS; SUBCUTANEOUS at 05:23

## 2025-06-02 SDOH — HEALTH STABILITY: PHYSICAL HEALTH: ON AVERAGE, HOW MANY MINUTES DO YOU ENGAGE IN EXERCISE AT THIS LEVEL?: 60 MIN

## 2025-06-02 SDOH — HEALTH STABILITY: MENTAL HEALTH: HOW MANY DRINKS CONTAINING ALCOHOL DO YOU HAVE ON A TYPICAL DAY WHEN YOU ARE DRINKING?: PATIENT DOES NOT DRINK

## 2025-06-02 SDOH — SOCIAL STABILITY: SOCIAL INSECURITY: WITHIN THE LAST YEAR, HAVE YOU BEEN HUMILIATED OR EMOTIONALLY ABUSED IN OTHER WAYS BY YOUR PARTNER OR EX-PARTNER?: NO

## 2025-06-02 SDOH — ECONOMIC STABILITY: FOOD INSECURITY: WITHIN THE PAST 12 MONTHS, YOU WORRIED THAT YOUR FOOD WOULD RUN OUT BEFORE YOU GOT THE MONEY TO BUY MORE.: NEVER TRUE

## 2025-06-02 SDOH — ECONOMIC STABILITY: FOOD INSECURITY: WITHIN THE PAST 12 MONTHS, THE FOOD YOU BOUGHT JUST DIDN'T LAST AND YOU DIDN'T HAVE MONEY TO GET MORE.: NEVER TRUE

## 2025-06-02 SDOH — SOCIAL STABILITY: SOCIAL NETWORK: HOW OFTEN DO YOU ATTEND MEETINGS OF THE CLUBS OR ORGANIZATIONS YOU BELONG TO?: NEVER

## 2025-06-02 SDOH — ECONOMIC STABILITY: HOUSING INSECURITY: AT ANY TIME IN THE PAST 12 MONTHS, WERE YOU HOMELESS OR LIVING IN A SHELTER (INCLUDING NOW)?: NO

## 2025-06-02 SDOH — HEALTH STABILITY: MENTAL HEALTH: HOW OFTEN DO YOU HAVE SIX OR MORE DRINKS ON ONE OCCASION?: NEVER

## 2025-06-02 SDOH — ECONOMIC STABILITY: TRANSPORTATION INSECURITY: IN THE PAST 12 MONTHS, HAS LACK OF TRANSPORTATION KEPT YOU FROM MEDICAL APPOINTMENTS OR FROM GETTING MEDICATIONS?: NO

## 2025-06-02 SDOH — ECONOMIC STABILITY: FOOD INSECURITY: HOW HARD IS IT FOR YOU TO PAY FOR THE VERY BASICS LIKE FOOD, HOUSING, MEDICAL CARE, AND HEATING?: NOT HARD AT ALL

## 2025-06-02 SDOH — HEALTH STABILITY: MENTAL HEALTH: HOW OFTEN DO YOU HAVE A DRINK CONTAINING ALCOHOL?: NEVER

## 2025-06-02 SDOH — ECONOMIC STABILITY: INCOME INSECURITY: IN THE PAST 12 MONTHS HAS THE ELECTRIC, GAS, OIL, OR WATER COMPANY THREATENED TO SHUT OFF SERVICES IN YOUR HOME?: NO

## 2025-06-02 SDOH — SOCIAL STABILITY: SOCIAL INSECURITY: WITHIN THE LAST YEAR, HAVE YOU BEEN AFRAID OF YOUR PARTNER OR EX-PARTNER?: NO

## 2025-06-02 SDOH — HEALTH STABILITY: PHYSICAL HEALTH: ON AVERAGE, HOW MANY DAYS PER WEEK DO YOU ENGAGE IN MODERATE TO STRENUOUS EXERCISE (LIKE A BRISK WALK)?: 7 DAYS

## 2025-06-02 SDOH — ECONOMIC STABILITY: HOUSING INSECURITY: IN THE PAST 12 MONTHS, HOW MANY TIMES HAVE YOU MOVED WHERE YOU WERE LIVING?: 0

## 2025-06-02 SDOH — SOCIAL STABILITY: SOCIAL NETWORK: HOW OFTEN DO YOU ATTEND CHURCH OR RELIGIOUS SERVICES?: NEVER

## 2025-06-02 SDOH — SOCIAL STABILITY: SOCIAL INSECURITY: ARE YOU MARRIED, WIDOWED, DIVORCED, SEPARATED, NEVER MARRIED, OR LIVING WITH A PARTNER?: MARRIED

## 2025-06-02 SDOH — ECONOMIC STABILITY: HOUSING INSECURITY: IN THE LAST 12 MONTHS, WAS THERE A TIME WHEN YOU WERE NOT ABLE TO PAY THE MORTGAGE OR RENT ON TIME?: NO

## 2025-06-02 SDOH — SOCIAL STABILITY: SOCIAL NETWORK: HOW OFTEN DO YOU GET TOGETHER WITH FRIENDS OR RELATIVES?: ONCE A WEEK

## 2025-06-02 ASSESSMENT — ENCOUNTER SYMPTOMS
WHEEZING: 0
VOMITING: 1
CONSTIPATION: 0
UNEXPECTED WEIGHT CHANGE: 0
DIZZINESS: 0
HEADACHES: 0
JOINT SWELLING: 0
FEVER: 0
COLOR CHANGE: 0
LIGHT-HEADEDNESS: 0
ARTHRALGIAS: 0
SLEEP DISTURBANCE: 0
DIARRHEA: 1
CHILLS: 0
NAUSEA: 1
DIFFICULTY URINATING: 0
CONFUSION: 0
COUGH: 0
TROUBLE SWALLOWING: 0
SHORTNESS OF BREATH: 0
BLOOD IN STOOL: 0
SPEECH DIFFICULTY: 0
ABDOMINAL DISTENTION: 1
ABDOMINAL PAIN: 1

## 2025-06-02 ASSESSMENT — PAIN - FUNCTIONAL ASSESSMENT
PAIN_FUNCTIONAL_ASSESSMENT: 0-10
PAIN_FUNCTIONAL_ASSESSMENT: 0-10

## 2025-06-02 ASSESSMENT — PAIN SCALES - GENERAL: PAINLEVEL_OUTOF10: 0 - NO PAIN

## 2025-06-02 ASSESSMENT — LIFESTYLE VARIABLES
AUDIT-C TOTAL SCORE: 0
SKIP TO QUESTIONS 9-10: 1

## 2025-06-02 ASSESSMENT — ACTIVITIES OF DAILY LIVING (ADL)
LACK_OF_TRANSPORTATION: NO
LACK_OF_TRANSPORTATION: NO

## 2025-06-02 ASSESSMENT — PAIN SCALES - WONG BAKER: WONGBAKER_NUMERICALRESPONSE: NO HURT

## 2025-06-02 NOTE — PROGRESS NOTES
06/02/25 1054   [facility] Disability Status   Are you deaf or do you have serious difficulty hearing? N   Are you blind or do you have serious difficulty seeing, even when wearing glasses? N   Because of a physical, mental, or emotional condition, do you have serious difficulty concentrating, remembering, or making decisions? (5 years old or older) N   Do you have serious difficulty walking or climbing stairs? N   Do you have serious difficulty dressing or bathing? N   Because of a physical, mental, or emotional condition, do you have serious difficulty doing errands alone such as visiting the doctor? N

## 2025-06-02 NOTE — ADDENDUM NOTE
Addendum  created 06/02/25 1502 by Fernando Golden MD    Review and Sign - Ready for Procedure

## 2025-06-02 NOTE — CARE PLAN
Problem: Pain  Goal: Takes deep breaths with improved pain control throughout the shift  Outcome: Progressing  Goal: Turns in bed with improved pain control throughout the shift  Outcome: Progressing  Goal: Walks with improved pain control throughout the shift  Outcome: Progressing  Goal: Performs ADL's with improved pain control throughout shift  Outcome: Progressing  Goal: Participates in PT with improved pain control throughout the shift  Outcome: Progressing  Goal: Free from opioid side effects throughout the shift  Outcome: Progressing  Goal: Free from acute confusion related to pain meds throughout the shift  Outcome: Progressing     Problem: Pain - Adult  Goal: Verbalizes/displays adequate comfort level or baseline comfort level  Outcome: Progressing     Problem: Safety - Adult  Goal: Free from fall injury  Outcome: Progressing     Problem: Discharge Planning  Goal: Discharge to home or other facility with appropriate resources  Outcome: Progressing     Problem: Chronic Conditions and Co-morbidities  Goal: Patient's chronic conditions and co-morbidity symptoms are monitored and maintained or improved  Outcome: Progressing     Problem: Nutrition  Goal: Nutrient intake appropriate for maintaining nutritional needs  Outcome: Progressing     Problem: Fall/Injury  Goal: Not fall by end of shift  Outcome: Progressing  Goal: Be free from injury by end of the shift  Outcome: Progressing  Goal: Verbalize understanding of personal risk factors for fall in the hospital  Outcome: Progressing  Goal: Verbalize understanding of risk factor reduction measures to prevent injury from fall in the home  Outcome: Progressing  Goal: Use assistive devices by end of the shift  Outcome: Progressing  Goal: Pace activities to prevent fatigue by end of the shift  Outcome: Progressing   The patient's goals for the shift include      The clinical goals for the shift include monitor signs & symptoms    Over the shift, the patient did  make progress to the above goals.

## 2025-06-02 NOTE — ASSESSMENT & PLAN NOTE
Problem has been stable for years. CBC pending   Start on PPI every 12 hours.  Give  antiemetic as needed  Start on IV fluids

## 2025-06-02 NOTE — CARE PLAN
The patient's goals for the shift include  EGD    The clinical goals for the shift include manage GI symptoms      Problem: Pain  Goal: Takes deep breaths with improved pain control throughout the shift  Outcome: Progressing  Goal: Turns in bed with improved pain control throughout the shift  Outcome: Progressing  Goal: Walks with improved pain control throughout the shift  Outcome: Progressing  Goal: Performs ADL's with improved pain control throughout shift  Outcome: Progressing  Goal: Participates in PT with improved pain control throughout the shift  Outcome: Progressing  Goal: Free from opioid side effects throughout the shift  Outcome: Progressing  Goal: Free from acute confusion related to pain meds throughout the shift  Outcome: Progressing     Problem: Pain - Adult  Goal: Verbalizes/displays adequate comfort level or baseline comfort level  Outcome: Progressing     Problem: Safety - Adult  Goal: Free from fall injury  Outcome: Progressing     Problem: Discharge Planning  Goal: Discharge to home or other facility with appropriate resources  Outcome: Progressing     Problem: Chronic Conditions and Co-morbidities  Goal: Patient's chronic conditions and co-morbidity symptoms are monitored and maintained or improved  Outcome: Progressing     Problem: Nutrition  Goal: Nutrient intake appropriate for maintaining nutritional needs  Outcome: Progressing     Problem: Fall/Injury  Goal: Not fall by end of shift  Outcome: Progressing  Goal: Be free from injury by end of the shift  Outcome: Progressing  Goal: Verbalize understanding of personal risk factors for fall in the hospital  Outcome: Progressing  Goal: Verbalize understanding of risk factor reduction measures to prevent injury from fall in the home  Outcome: Progressing  Goal: Use assistive devices by end of the shift  Outcome: Progressing  Goal: Pace activities to prevent fatigue by end of the shift  Outcome: Progressing

## 2025-06-02 NOTE — CONSULTS
"Consults    Reason For Consult  Epigastric Pain    History Of Present Illness  Levy Flores is a 60 y.o. male presenting with abdominal pain, NVD. This has been recurrent issue over the past few weeks. Patient was screaming, crying, restless in pain. CT a/p no acute findings. He did have prior partial colectomy related to diverticulitis. Patient tells me had similar episode many years ago \"with hole in his stomach.\" There is no free air on CT. Pain belly button level. HH normal. He denies black or bloody stools. Denies hematemesis. Rare NSAIDs no anticoagulation.     LFTs slightly elevated. Lipase normal. Prior cholecytectomy. CBD normal on CT      Past Medical History  He has a past medical history of Abdominal pain (05/27/2025), Acute kidney injury (05/27/2025), Diverticulitis of colon (03/27/2014), Elevated blood pressure reading (05/27/2025), Gastro-esophageal reflux disease without esophagitis (06/01/2025), Gouty arthritis of right ankle (06/01/2025), Nausea and vomiting (05/27/2025), and Somatization disorder (05/27/2025).    Surgical History  He has no past surgical history on file.     Social History  He reports that he has never smoked. He has never used smokeless tobacco. He reports that he does not drink alcohol and does not use drugs.    Family History  Family History[1]     Allergies  Morphine    Review of Systems   Constitutional:  Negative for chills, fever and unexpected weight change.   HENT:  Negative for congestion and trouble swallowing.    Respiratory:  Negative for cough, shortness of breath and wheezing.    Cardiovascular:  Negative for chest pain.   Gastrointestinal:  Positive for abdominal distention, abdominal pain, diarrhea, nausea and vomiting. Negative for blood in stool and constipation.   Genitourinary:  Negative for difficulty urinating.   Musculoskeletal:  Negative for arthralgias and joint swelling.   Skin:  Negative for color change.   Neurological:  Negative for dizziness, " "speech difficulty, light-headedness and headaches.   Psychiatric/Behavioral:  Negative for confusion and sleep disturbance.         Physical Exam  Vitals reviewed.   Constitutional:       General: He is awake.      Appearance: Normal appearance.      Comments: Restless   HENT:      Head: Normocephalic and atraumatic.      Mouth/Throat:      Mouth: Mucous membranes are moist.   Cardiovascular:      Rate and Rhythm: Normal rate and regular rhythm.   Pulmonary:      Effort: Pulmonary effort is normal.      Breath sounds: Normal breath sounds.   Abdominal:      General: There is no distension.      Palpations: Abdomen is soft.      Tenderness: There is abdominal tenderness. There is no guarding.   Musculoskeletal:      Cervical back: Normal range of motion and neck supple.   Skin:     General: Skin is warm and dry.   Neurological:      General: No focal deficit present.      Mental Status: He is alert and oriented to person, place, and time. Mental status is at baseline.      Comments: Anxious    Psychiatric:         Attention and Perception: Attention and perception normal.         Mood and Affect: Mood normal.         Behavior: Behavior normal.          Last Recorded Vitals  Blood pressure (!) 184/100, pulse 81, temperature 37.1 °C (98.8 °F), temperature source Temporal, resp. rate 20, height 1.753 m (5' 9\"), weight 83 kg (183 lb), SpO2 97%.    Relevant Results  Results for orders placed or performed during the hospital encounter of 06/01/25 (from the past 24 hours)   CBC and Auto Differential   Result Value Ref Range    WBC 13.0 (H) 4.4 - 11.3 x10*3/uL    nRBC 0.0 0.0 - 0.0 /100 WBCs    RBC 5.35 4.50 - 5.90 x10*6/uL    Hemoglobin 15.4 13.5 - 17.5 g/dL    Hematocrit 44.0 41.0 - 52.0 %    MCV 82 80 - 100 fL    MCH 28.8 26.0 - 34.0 pg    MCHC 35.0 32.0 - 36.0 g/dL    RDW 11.8 11.5 - 14.5 %    Platelets 362 150 - 450 x10*3/uL    Neutrophils % 63.8 40.0 - 80.0 %    Immature Granulocytes %, Automated 0.5 0.0 - 0.9 %    " Lymphocytes % 24.7 13.0 - 44.0 %    Monocytes % 9.8 2.0 - 10.0 %    Eosinophils % 0.8 0.0 - 6.0 %    Basophils % 0.4 0.0 - 2.0 %    Neutrophils Absolute 8.32 (H) 1.20 - 7.70 x10*3/uL    Immature Granulocytes Absolute, Automated 0.07 0.00 - 0.70 x10*3/uL    Lymphocytes Absolute 3.21 1.20 - 4.80 x10*3/uL    Monocytes Absolute 1.27 (H) 0.10 - 1.00 x10*3/uL    Eosinophils Absolute 0.10 0.00 - 0.70 x10*3/uL    Basophils Absolute 0.05 0.00 - 0.10 x10*3/uL   Comprehensive metabolic panel   Result Value Ref Range    Glucose 123 (H) 74 - 99 mg/dL    Sodium 135 (L) 136 - 145 mmol/L    Potassium 3.9 3.5 - 5.3 mmol/L    Chloride 100 98 - 107 mmol/L    Bicarbonate 23 21 - 32 mmol/L    Anion Gap 16 10 - 20 mmol/L    Urea Nitrogen 21 6 - 23 mg/dL    Creatinine 0.95 0.50 - 1.30 mg/dL    eGFR >90 >60 mL/min/1.73m*2    Calcium 9.5 8.6 - 10.3 mg/dL    Albumin 4.8 3.4 - 5.0 g/dL    Alkaline Phosphatase 41 33 - 136 U/L    Total Protein 7.7 6.4 - 8.2 g/dL    AST 41 (H) 9 - 39 U/L    Bilirubin, Total 1.6 (H) 0.0 - 1.2 mg/dL    ALT 97 (H) 10 - 52 U/L   Lipase   Result Value Ref Range    Lipase 38 9 - 82 U/L   Lactate   Result Value Ref Range    Lactate 1.5 0.4 - 2.0 mmol/L   Urinalysis with Reflex Culture and Microscopic   Result Value Ref Range    Color, Urine Yellow Light-Yellow, Yellow, Dark-Yellow    Appearance, Urine Clear Clear    Specific Gravity, Urine 1.034 1.005 - 1.035    pH, Urine 6.0 5.0, 5.5, 6.0, 6.5, 7.0, 7.5, 8.0    Protein, Urine 10 (TRACE) NEGATIVE, 10 (TRACE), 20 (TRACE) mg/dL    Glucose, Urine Normal Normal mg/dL    Blood, Urine NEGATIVE NEGATIVE mg/dL    Ketones, Urine 10 (1+) (A) NEGATIVE mg/dL    Bilirubin, Urine NEGATIVE NEGATIVE mg/dL    Urobilinogen, Urine 3 (1+) (A) Normal mg/dL    Nitrite, Urine NEGATIVE NEGATIVE    Leukocyte Esterase, Urine NEGATIVE NEGATIVE   Urinalysis Microscopic   Result Value Ref Range    WBC, Urine NONE 1-5, NONE /HPF    RBC, Urine NONE NONE, 1-2, 3-5 /HPF    Mucus, Urine FEW Reference  range not established. /LPF   Drug Screen, Urine   Result Value Ref Range    Amphetamine Screen, Urine Presumptive Negative Presumptive Negative    Barbiturate Screen, Urine Presumptive Negative Presumptive Negative    Benzodiazepines Screen, Urine Presumptive Positive (A) Presumptive Negative    Cannabinoid Screen, Urine Presumptive Positive (A) Presumptive Negative    Cocaine Metabolite Screen, Urine Presumptive Negative Presumptive Negative    Fentanyl Screen, Urine Presumptive Negative Presumptive Negative    Opiate Screen, Urine Presumptive Positive (A) Presumptive Negative    Oxycodone Screen, Urine Presumptive Positive (A) Presumptive Negative    PCP Screen, Urine Presumptive Negative Presumptive Negative    Methadone Screen, Urine Presumptive Negative Presumptive Negative   ECG 12 lead   Result Value Ref Range    Ventricular Rate 82 BPM    Atrial Rate 82 BPM    WI Interval 144 ms    QRS Duration 88 ms    QT Interval 392 ms    QTC Calculation(Bazett) 457 ms    P Axis 25 degrees    R Axis 21 degrees    T Axis 45 degrees    QRS Count 14 beats    Q Onset 223 ms    P Onset 151 ms    P Offset 216 ms    T Offset 419 ms    QTC Fredericia 435 ms   CBC   Result Value Ref Range    WBC 10.0 4.4 - 11.3 x10*3/uL    nRBC 0.0 0.0 - 0.0 /100 WBCs    RBC 4.90 4.50 - 5.90 x10*6/uL    Hemoglobin 14.1 13.5 - 17.5 g/dL    Hematocrit 40.5 (L) 41.0 - 52.0 %    MCV 83 80 - 100 fL    MCH 28.8 26.0 - 34.0 pg    MCHC 34.8 32.0 - 36.0 g/dL    RDW 11.8 11.5 - 14.5 %    Platelets 345 150 - 450 x10*3/uL   Comprehensive metabolic panel   Result Value Ref Range    Glucose 98 74 - 99 mg/dL    Sodium 136 136 - 145 mmol/L    Potassium 3.6 3.5 - 5.3 mmol/L    Chloride 102 98 - 107 mmol/L    Bicarbonate 24 21 - 32 mmol/L    Anion Gap 14 10 - 20 mmol/L    Urea Nitrogen 13 6 - 23 mg/dL    Creatinine 0.85 0.50 - 1.30 mg/dL    eGFR >90 >60 mL/min/1.73m*2    Calcium 9.0 8.6 - 10.3 mg/dL    Albumin 4.0 3.4 - 5.0 g/dL    Alkaline Phosphatase 38 33 - 136  U/L    Total Protein 6.6 6.4 - 8.2 g/dL    AST 41 (H) 9 - 39 U/L    Bilirubin, Total 1.6 (H) 0.0 - 1.2 mg/dL    ALT 83 (H) 10 - 52 U/L   Electrocardiogram, 12-lead PRN ACS symptoms   Result Value Ref Range    Ventricular Rate 81 BPM    Atrial Rate 81 BPM    [place] Interval 152 ms    QRS Duration 82 ms    QT Interval 392 ms    QTC Calculation(Bazett) 455 ms    P Axis 42 degrees    R Axis -18 degrees    T Axis 29 degrees    QRS Count 13 beats    Q Onset 225 ms    P Onset 149 ms    P Offset 210 ms    T Offset 421 ms    QTC Fredericia 433 ms     Electrocardiogram, 12-lead PRN ACS symptoms  Result Date: 6/2/2025  Normal sinus rhythm Normal ECG When compared with ECG of 01-JUN-2025 18:26, (unconfirmed) Premature atrial complexes are no longer Present    ECG 12 lead  Result Date: 6/2/2025  1 Poor data quality, interpretation may be adversely affected Sinus rhythm with Premature atrial complexes Otherwise normal ECG When compared with ECG of 28-MAY-2025 10:14, (unconfirmed) ST no longer depressed in Inferior leads Nonspecific T wave abnormality now evident in Inferior leads    ECG 12 lead  Result Date: 6/1/2025  Sinus tachycardia with Premature supraventricular complexes Right atrial enlargement Borderline ECG Confirmed by Dayton Chou ([id number]) on 6/1/2025 9:08:11 PM    CT abdomen pelvis w IV and oral contrast  Result Date: 6/1/2025  Interpreted By:  Mack Wilson, STUDY: CT ABDOMEN PELVIS W IV AND ORAL CONTRAST;  6/1/2025 5:58 pm   INDICATION: Signs/Symptoms:Worsening abdominal pain.   COMPARISON: 05/26/2025   ACCESSION NUMBER(S): [id number]   ORDERING CLINICIAN: SUZETTE SHEEHAN   TECHNIQUE: Axial CT images of the abdomen and pelvis with coronal and sagittal reconstructed images obtained.  75 ML of Omnipaque 350 was administered intravenously without immediate complication. Positive oral contrast was given.   FINDINGS: Limited evaluation due to motion artifact and discontiguous scanning.   LOWER CHEST: Small left Bochdalek  hernia.   LIVER: Within normal limits.   BILE DUCTS: Normal caliber.   GALLBLADDER: Cholecystectomy.   PANCREAS: Within normal limits.   SPLEEN: Within normal limits.   ADRENALS: Within normal limits.   KIDNEYS, URETERS, and BLADDER: No hydronephrosis or renal calculi. Ureters are non-dilated. Urinary bladder within normal limits.   REPRODUCTIVE: No pelvic masses.   VESSELS: Minimal scattered atherosclerotic plaque. No abdominal aortic aneurysm.   RETROPERITONEUM and LYMPH NODES: No lymphadenopathy.   BOWEL: The stomach is unremarkable. Small bowel is non-dilated. Appendix is not definitively identified. No pericecal inflammation. Postsurgical change related to partial distal colonic resection and rectosigmoid anastomosis. There is mild nonspecific wall thickening of the rectum which could be secondary to underdistention or mild proctitis.   PERITONEUM: No ascites or free air. No fluid collection.   BODY WALL:  Mild ventral abdominal wall scarring.   MUSCULOSKELETAL: Moderate multilevel spinal degenerative change. There is grade 1 anterolisthesis of L5 on S1 measuring 1.2 cm. Chronic L5 pars interarticularis defects. Motion artifact substantially limits evaluation spine.       1. No definite acute abdominal or pelvic process. 2. Mild nonspecific wall thickening of the rectum which could be secondary to underdistention or mild proctitis. 3. Grade 1 anterolisthesis of L5 on S1 measuring 1.2 cm. Chronic L5 pars interarticularis defects. 4. Limited evaluation due to motion artifact and discontiguous scanning.   Signed by: Mack Wilson 6/1/2025 6:58 PM Dictation workstation:   [id number]    CT abdomen pelvis wo IV contrast  Result Date: 5/26/2025  Interpreted By:  Anitha Wu, STUDY: CT abdomen pelvis without contrast   INDICATION: Signs/Symptoms:Abdominal pain.   COMPARISON: None.   ACCESSION NUMBER(S): [id number]   ORDERING CLINICIAN: DALI JENKINS   TECHNIQUE: Axial noncontrast CT images of the abdomen  and pelvis with coronal and sagittal reconstructed images.   FINDINGS: LOWER CHEST: No acute abnormality of the lung bases. Possible small hiatal hernia. BONES: Degenerative changes similar to prior imaging with multilevel small Schmorl's nodes and endplate irregularity. Grade 1/grade 2 anterolisthesis of L5 on S1 measuring up to 10 mm in bilateral chronic appearing pars defects, not significantly changed. ABDOMINAL WALL: Possible postsurgical changes suggestive of prior mesh hernia repair.   ABDOMEN: Lack of intravenous contrast limits evaluation of vessels and solid organs. LIVER: Within normal limits. BILE DUCTS: Normal caliber. GALLBLADDER: Status post cholecystectomy. PANCREAS: No peripancreatic inflammatory stranding or duct dilatation. SPLEEN: Within normal limits. ADRENALS: Within normal limits.   KIDNEYS, URETERS, URINARY BLADDER: Punctate non-obstructing left lower pole renal calculus. Mild renal atrophy. No hydroureteronephrosis.  The urinary bladder is unremarkable.   VESSELS: No aortic aneurysm. RETROPERITONEUM: No pathologically enlarged lymph nodes.   PELVIS:   REPRODUCTIVE ORGANS: No abnormality, given limitations of the noncontrast CT.   BOWEL: Postsurgical changes in the distal colon suggestive of prior partial colectomy.  Mild nonspecific fluid filled loops of small bowel without significant inflammatory changes or distention. PERITONEUM: No ascites or free air, no fluid collection.       The no definite acute abdominal/pelvic abnormality identified. Few non-specific fluid-filled loops of nondistended small bowel noted.   Nephrolithiasis, trace hiatal hernia and additional findings as detailed.     MACRO: None   Signed by: Anitha Wu 5/26/2025 11:02 PM Dictation workstation:   [id number]    XR chest 1 view  Result Date: 5/26/2025  Interpreted By:  Simba Hinojosa, STUDY: XR CHEST 1 VIEW;  5/26/2025 10:04 pm   INDICATION: Signs/Symptoms:Cough.     COMPARISON: 02/10/2025   ACCESSION  "NUMBER(S): [id number]   ORDERING CLINICIAN: DALI JENKINS   FINDINGS:     The cardiomediastinal silhouette and pulmonary vasculature are within normal limits.   No consolidation, pleural effusion or pneumothorax.         No acute cardiopulmonary process.     MACRO: None.   Signed by: Simba Hinojosa 5/26/2025 10:16 PM Dictation workstation:   [id number]         Assessment/Plan     Abdominal Pain, Nausea, Vomiting   CT a/p no acute findings. He does have slight elevated LFTs. Prior cholecystectomy. CBD normal on imaging. Seems less likely biliary etiology     Patient is screaming in pain. I did consult surgery. Reviewed with Dr Bhagat. Given hx \"hole in stomach\" we will complete EGD. I do not see any records of perforated gastric ulcer. He did have partial colectomy for diverticulitis. Patient is convinced her has an ulcer. PPI BID. Further recs to follow procedure at [facility] today. He is extremely anxious. Agree with primary recs     There is possible mild rectal thickening. Pain is near belly button/epigastric. Seems less likely source. We can consider flex sig     I spent 30 minutes in the professional and overall care of this patient.               [1]   Family History  Problem Relation Name Age of Onset    Cancer Mother      No Known Problems Father       "

## 2025-06-02 NOTE — PROGRESS NOTES
06/02/25 1048   Discharge Planning   Living Arrangements Spouse/significant other;Children   Support Systems Spouse/significant other;Children;Family members   Assistance Needed independent with ADLs and IADLS   Type of Residence Private residence  (multi-level home)   Who is requesting discharge planning? Provider   Home or Post Acute Services None   Expected Discharge Disposition Home   Financial Resource Strain   How hard is it for you to pay for the very basics like food, housing, medical care, and heating? Not hard   Housing Stability   In the last 12 months, was there a time when you were not able to pay the mortgage or rent on time? N   In the past 12 months, how many times have you moved where you were living? 0   At any time in the past 12 months, were you homeless or living in a shelter (including now)? N   Transportation Needs   In the past 12 months, has lack of transportation kept you from medical appointments or from getting medications? no   In the past 12 months, has lack of transportation kept you from meetings, work, or from getting things needed for daily living? No   Patient Choice   Patient / Family choosing to utilize agency / facility established prior to hospitalization No   Stroke Family Assessment   Stroke Family Assessment Needed No   Intensity of Service   Intensity of Service 0-30 min     Levy Flores is a 60 y.o. male with past medical history of nausea, vomiting, abdominal pain, upper GI bleed, GERD, chronic diverticulitis with remote colon resection, status post cholecystectomy, somatization disorder.  Patient is scheduled for an EGD at [facility].      Patient sen in bedside, patient was sitting at edge of bed, doubled over in pain. Was not interactive due to pain, did answer a few questions. Lives with spouse and children, in a multi-level home. Independent with ADLs and IADLs, does work and drive. Patient is highly anxious. Will have no needs upon discharge.     PLAN  Home with no needs     1558: Resource information regarding Behavioral Health was given to patient, for his anxiety.

## 2025-06-02 NOTE — PROGRESS NOTES
06/02/25 1054   Physical Activity   On average, how many days per week do you engage in moderate to strenuous exercise (like a brisk walk)? 7 days   On average, how many minutes do you engage in exercise at this level? 60 min   Financial Resource Strain   How hard is it for you to pay for the very basics like food, housing, medical care, and heating? Not hard   Housing Stability   In the last 12 months, was there a time when you were not able to pay the mortgage or rent on time? N   In the past 12 months, how many times have you moved where you were living? 0   At any time in the past 12 months, were you homeless or living in a shelter (including now)? N   Transportation Needs   In the past 12 months, has lack of transportation kept you from medical appointments or from getting medications? no   In the past 12 months, has lack of transportation kept you from meetings, work, or from getting things needed for daily living? No   Food Insecurity   Within the past 12 months, you worried that your food would run out before you got the money to buy more. Never true   Within the past 12 months, the food you bought just didn't last and you didn't have money to get more. Never true   Stress   Do you feel stress - tense, restless, nervous, or anxious, or unable to sleep at night because your mind is troubled all the time - these days? Not at all   Social Connections   In a typical week, how many times do you talk on the phone with family, friends, or neighbors? More than 3   How often do you get together with friends or relatives? Once   How often do you attend Buddhism or Restorationism services? Never   Do you belong to any clubs or organizations such as Buddhism groups, unions, fraternal or athletic groups, or school groups? No   How often do you attend meetings of the clubs or organizations you belong to? Never   Are you , , , , never , or living with a partner?    Intimate Partner  Violence   Within the last year, have you been afraid of your partner or ex-partner? No   Within the last year, have you been humiliated or emotionally abused in other ways by your partner or ex-partner? No   Within the last year, have you been raped or forced to have any kind of sexual activity by your partner or ex-partner? No   Alcohol Use   Q1: How often do you have a drink containing alcohol? Never   Q2: How many drinks containing alcohol do you have on a typical day when you are drinking? None   Q3: How often do you have six or more drinks on one occasion? Never   Utilities   In the past 12 months has the electric, gas, oil, or water company threatened to shut off services in your home? No   Health Literacy   How often do you need to have someone help you when you read instructions, pamphlets, or other written material from your doctor or pharmacy? Never   Follow-Ups   We make community resources available to all of our patients to assist with everyday needs. We may be able to connect you with those resources. Would you be interested? N

## 2025-06-02 NOTE — PROGRESS NOTES
Levy Flores is a 60 y.o. male on day 1 of admission presenting with Nausea and vomiting, unspecified vomiting type.      Subjective   Complains of just some generalized abdominal discomfort.  Anxious this morning.  We did discuss his long history of anxiety.  He was seen by psychiatrist in the past but has not seen a mental health professional in some time.  He is not suicidal but does admit to stress impacting his life and possibly contributor to his symptoms.       Objective     Last Recorded Vitals  BP (!) 184/100 (BP Location: Left arm)   Pulse 81   Temp 37.1 °C (98.8 °F) (Temporal)   Resp 20   Wt 83 kg (183 lb)   SpO2 97%   Intake/Output last 3 Shifts:  No intake or output data in the 24 hours ending 06/02/25 0852    Admission Weight  Weight: 80.7 kg (178 lb) (06/01/25 1605)    Daily Weight  06/01/25 : 83 kg (183 lb)    Image Results  ECG 12 lead  Normal sinus rhythm  Normal ECG  When compared with ECG of 01-JUN-2025 18:26, (unconfirmed)  Premature atrial complexes are no longer Present      Physical Exam  Generally no acute distress slightly anxious, no pressured speech, alert and oriented.  HEENT PERRLA my  Cardiovascular S1-S2 regular rate rhythm  Lungs clear to auscultation bilaterally  Abdomen nontender bowel sounds present  Extremities no clubbing cyanosis edema    Relevant Results  Scheduled medications  Scheduled Medications[1]  Continuous medications  Continuous Medications[2]  PRN medications  PRN Medications[3]  Results for orders placed or performed during the hospital encounter of 06/01/25 (from the past 24 hours)   CBC and Auto Differential   Result Value Ref Range    WBC 13.0 (H) 4.4 - 11.3 x10*3/uL    nRBC 0.0 0.0 - 0.0 /100 WBCs    RBC 5.35 4.50 - 5.90 x10*6/uL    Hemoglobin 15.4 13.5 - 17.5 g/dL    Hematocrit 44.0 41.0 - 52.0 %    MCV 82 80 - 100 fL    MCH 28.8 26.0 - 34.0 pg    MCHC 35.0 32.0 - 36.0 g/dL    RDW 11.8 11.5 - 14.5 %    Platelets 362 150 - 450 x10*3/uL    Neutrophils  % 63.8 40.0 - 80.0 %    Immature Granulocytes %, Automated 0.5 0.0 - 0.9 %    Lymphocytes % 24.7 13.0 - 44.0 %    Monocytes % 9.8 2.0 - 10.0 %    Eosinophils % 0.8 0.0 - 6.0 %    Basophils % 0.4 0.0 - 2.0 %    Neutrophils Absolute 8.32 (H) 1.20 - 7.70 x10*3/uL    Immature Granulocytes Absolute, Automated 0.07 0.00 - 0.70 x10*3/uL    Lymphocytes Absolute 3.21 1.20 - 4.80 x10*3/uL    Monocytes Absolute 1.27 (H) 0.10 - 1.00 x10*3/uL    Eosinophils Absolute 0.10 0.00 - 0.70 x10*3/uL    Basophils Absolute 0.05 0.00 - 0.10 x10*3/uL   Comprehensive metabolic panel   Result Value Ref Range    Glucose 123 (H) 74 - 99 mg/dL    Sodium 135 (L) 136 - 145 mmol/L    Potassium 3.9 3.5 - 5.3 mmol/L    Chloride 100 98 - 107 mmol/L    Bicarbonate 23 21 - 32 mmol/L    Anion Gap 16 10 - 20 mmol/L    Urea Nitrogen 21 6 - 23 mg/dL    Creatinine 0.95 0.50 - 1.30 mg/dL    eGFR >90 >60 mL/min/1.73m*2    Calcium 9.5 8.6 - 10.3 mg/dL    Albumin 4.8 3.4 - 5.0 g/dL    Alkaline Phosphatase 41 33 - 136 U/L    Total Protein 7.7 6.4 - 8.2 g/dL    AST 41 (H) 9 - 39 U/L    Bilirubin, Total 1.6 (H) 0.0 - 1.2 mg/dL    ALT 97 (H) 10 - 52 U/L   Lipase   Result Value Ref Range    Lipase 38 9 - 82 U/L   Lactate   Result Value Ref Range    Lactate 1.5 0.4 - 2.0 mmol/L   Urinalysis with Reflex Culture and Microscopic   Result Value Ref Range    Color, Urine Yellow Light-Yellow, Yellow, Dark-Yellow    Appearance, Urine Clear Clear    Specific Gravity, Urine 1.034 1.005 - 1.035    pH, Urine 6.0 5.0, 5.5, 6.0, 6.5, 7.0, 7.5, 8.0    Protein, Urine 10 (TRACE) NEGATIVE, 10 (TRACE), 20 (TRACE) mg/dL    Glucose, Urine Normal Normal mg/dL    Blood, Urine NEGATIVE NEGATIVE mg/dL    Ketones, Urine 10 (1+) (A) NEGATIVE mg/dL    Bilirubin, Urine NEGATIVE NEGATIVE mg/dL    Urobilinogen, Urine 3 (1+) (A) Normal mg/dL    Nitrite, Urine NEGATIVE NEGATIVE    Leukocyte Esterase, Urine NEGATIVE NEGATIVE   Urinalysis Microscopic   Result Value Ref Range    WBC, Urine NONE 1-5,  NONE /HPF    RBC, Urine NONE NONE, 1-2, 3-5 /HPF    Mucus, Urine FEW Reference range not established. /LPF   Drug Screen, Urine   Result Value Ref Range    Amphetamine Screen, Urine Presumptive Negative Presumptive Negative    Barbiturate Screen, Urine Presumptive Negative Presumptive Negative    Benzodiazepines Screen, Urine Presumptive Positive (A) Presumptive Negative    Cannabinoid Screen, Urine Presumptive Positive (A) Presumptive Negative    Cocaine Metabolite Screen, Urine Presumptive Negative Presumptive Negative    Fentanyl Screen, Urine Presumptive Negative Presumptive Negative    Opiate Screen, Urine Presumptive Positive (A) Presumptive Negative    Oxycodone Screen, Urine Presumptive Positive (A) Presumptive Negative    PCP Screen, Urine Presumptive Negative Presumptive Negative    Methadone Screen, Urine Presumptive Negative Presumptive Negative   ECG 12 lead   Result Value Ref Range    Ventricular Rate 81 BPM    Atrial Rate 81 BPM    WA Interval 152 ms    QRS Duration 82 ms    QT Interval 392 ms    QTC Calculation(Bazett) 455 ms    P Axis 42 degrees    R Axis -18 degrees    T Axis 29 degrees    QRS Count 13 beats    Q Onset 225 ms    P Onset 149 ms    P Offset 210 ms    T Offset 421 ms    QTC Fredericia 433 ms   CBC   Result Value Ref Range    WBC 10.0 4.4 - 11.3 x10*3/uL    nRBC 0.0 0.0 - 0.0 /100 WBCs    RBC 4.90 4.50 - 5.90 x10*6/uL    Hemoglobin 14.1 13.5 - 17.5 g/dL    Hematocrit 40.5 (L) 41.0 - 52.0 %    MCV 83 80 - 100 fL    MCH 28.8 26.0 - 34.0 pg    MCHC 34.8 32.0 - 36.0 g/dL    RDW 11.8 11.5 - 14.5 %    Platelets 345 150 - 450 x10*3/uL   Comprehensive metabolic panel   Result Value Ref Range    Glucose 98 74 - 99 mg/dL    Sodium 136 136 - 145 mmol/L    Potassium 3.6 3.5 - 5.3 mmol/L    Chloride 102 98 - 107 mmol/L    Bicarbonate 24 21 - 32 mmol/L    Anion Gap 14 10 - 20 mmol/L    Urea Nitrogen 13 6 - 23 mg/dL    Creatinine 0.85 0.50 - 1.30 mg/dL    eGFR >90 >60 mL/min/1.73m*2    Calcium 9.0  8.6 - 10.3 mg/dL    Albumin 4.0 3.4 - 5.0 g/dL    Alkaline Phosphatase 38 33 - 136 U/L    Total Protein 6.6 6.4 - 8.2 g/dL    AST 41 (H) 9 - 39 U/L    Bilirubin, Total 1.6 (H) 0.0 - 1.2 mg/dL    ALT 83 (H) 10 - 52 U/L     Impression: 60-year-old gentleman with history of viscus perforation in past admitted with nausea vomiting and abdominal discomfort for several days.    Plan:    1.  Nausea vomiting and abdominal discomfort  - GI consulted, patient would likely benefit from an endoscopy.  Continue with supportive measures including antiemetics, PPI, IV fluids.  Will advance diet once GI has evaluated patient and decided on course of action.    2.  History anxiety disorder question of somatization disorder  - I did consult psychiatry to recommend possible initiation of SSRI therapy.  Patient will need to follow-up with psychiatry and probably would benefit from cognitive behavioral therapy based on my discussion with him today.  In the past it appears that he was just given Ativan but noted no real cognitive behavioral therapy.                                     Leon Perez MD           [1] dicyclomine, 10 mg, oral, 4x daily  famotidine, 20 mg, oral, BID   Or  famotidine, 20 mg, intravenous, BID  heparin (porcine), 5,000 Units, subcutaneous, q8h  pantoprazole, 40 mg, intravenous, BID  [2] lactated Ringer's, 125 mL/hr, Last Rate: 125 mL/hr (06/02/25 0525)  [3] PRN medications: acetaminophen **OR** acetaminophen **OR** acetaminophen, benzocaine-menthol, bisacodyl, bisacodyl, droperidol, ondansetron ODT **OR** ondansetron, polyethylene glycol, prochlorperazine **OR** prochlorperazine **OR** prochlorperazine

## 2025-06-02 NOTE — PROGRESS NOTES
Patient admitted to unit at approximately 2000 with complaints of nausea, vomitting, and abdominal discomfort. Patient denied any pain at the time of admission. A/O X4 and in no apparent distress. Abdomen soft, nontender, non-distended; sounds present in all quadrants. No emesis noted. Lactated Ringers initiated per provider order at continuous rate. Site patent with no signs of infiltration. NPO since midnight for possible scope.

## 2025-06-02 NOTE — ANESTHESIA PREPROCEDURE EVALUATION
Patient: Levy Flores    Procedure Information       Date/Time: 06/02/25 1125    Scheduled providers: Daryl Bhagat MD; Fernando Golden MD    Procedure: EGD    Location: [facility]            Relevant Problems   Neuro   (+) Anxiety   (+) Anxiety associated with depression   (+) Current severe episode of major depressive disorder without psychotic features (Multi)   (+) XOCHILT (generalized anxiety disorder)   (+) Stress disorder, acute      GI   (+) Gastro-esophageal reflux disease without esophagitis   (+) Upper gastrointestinal hemorrhage      Liver   (+) Chronic cholecystitis       Clinical information reviewed:                   NPO Detail:  No data recorded     Physical Exam    Airway  Mallampati: I  TM distance: >3 FB  Neck ROM: full     Cardiovascular    Dental    Pulmonary    Abdominal            Anesthesia Plan    History of general anesthesia?: yes  History of complications of general anesthesia?: no    ASA 3     MAC     intravenous induction   Anesthetic plan and risks discussed with patient.    Plan discussed with [facility]AA.

## 2025-06-02 NOTE — CONSULTS
"Inpatient consult to Acute Care Surgery  Consult performed by: Mercedez Elmore, APRN-CNP  Consult ordered by: Leon Perez MD      Levy Flores is a 60 y.o. male on day 1 of admission presenting with Nausea and vomiting, unspecified vomiting type.      Reason For Consult  Abdominal pain    Location [facility] PACU 8     History Of Present Illness  Levy Flores is a 60 y.o. male on day 1 of admission presenting with Nausea and vomiting, unspecified vomiting type.    Per the H&P the patient presented with a chief complaint of ongoing abdominal pain, intractable nausea, vomiting, diarrhea and he was admitted with nausea and vomiting unspecified vomiting type, abdominal pain.  We were asked to see him as above at [facility] while he is here after getting his EGD before he goes back to [facility].    Patient states that he presented with a chief complaint of abdominal pain just above his umbilicus.  This started 11 days ago gradually, constant, has been the same and no worse, no radiation, describes as sharp and burning, upon ER presentation was an 8 and is currently a 0.  He has had this multiple times in the past when he has had diverticulitis and states that he has been hospitalized quite a few times for diverticulitis with the last time being in January of this year.  Says he does not have any known abdominal or bilateral inguinal hernias.  Says last EGD was about 17 years ago for the indications of   \"a lot of vomiting issues\" and states that they \"noticed something\" but does not recall what it showed.  Says last colonoscopy was done about 5 years ago for the indications of screening and was negative.    Says that the nausea and vomiting started concurrently 11 days ago as well.  When asked if he is nauseated now he says \"I do not know\".  Last vomited yesterday without blood.    Has been having diarrhea for 1 week.  Had diarrhea still as of this morning without blood.  He had diarrhea once today.  Over the " past week has been having diarrhea couple of times a day.  Denies eating anything out of the ordinary, any sick contacts or any recent travels.  Denies ever having C. difficile before.  Denies being on any recent antibiotics.  Denies any inflammatory bowel disease, Crohn's, ulcerative colitis, etc.  Is passing gas.    Denies any previous history of elevated LFTs.  Does have a history of a cholecystectomy.  Does not take Tylenol excessively.  Takes NSAIDs rarely.  Denies ever having mono.  Denies any known liver disease, hepatitis, hepatitis A/B/C or ever having mono.  Said that he did have an ulcer x 1 in either his late teens or early 20s.    Patient states that now he is depressed because he was hoping that they would find a cause of his abdominal pain with the EGD.  Additionally states that he wonders how much of his abdominal pain is mental.    Denies taking any anticoagulants or prescription antiplatelet medications.  Please see his below past medical and past surgical history.  Please see his below labs and imaging and today's EGD that are all noted-denies ever having a hiatal hernia before.          Past Medical History  Past Medical History:   Diagnosis Date    Abdominal pain 05/27/2025    Acute kidney injury 05/27/2025    Diverticulitis of colon 03/27/2014    Elevated blood pressure reading 05/27/2025    Gastro-esophageal reflux disease without esophagitis 06/01/2025    Gouty arthritis of right ankle 06/01/2025    Nausea and vomiting 05/27/2025    Somatization disorder 05/27/2025   Anxiety  Ex EtOH abuse  GI bleed 18 years ago due to nausea and vomiting due to diverticulitis  Ulcer in either his late teens or early 20s  Ventral incisional hernia-repaired      Surgical History  Laparoscopic cholecystectomy 5 to 6 years ago  Ventral incisional hernia repair with mesh 16 years ago  17 years ago partial colon resection with temporary colostomy due to diverticulitis and had his colostomy taken down a few months  later     Social History  Comes from home with his wife  Says he is self-employed  Never smoker  Quit drinking alcohol 10 years ago-says prior to that he was a heavy drinker for 30 years-denies ever being diagnosed with cirrhosis  Occasional marijuana-no drugs otherwise     Family History  Patient says that his mother and pretty much everyone in his family has diverticulitis     Allergies  RX Allergies[1]     Review of Systems  1) Anesthetic complications: No known personal or family history of anesthetic complications  2) General: Patient says that he is unintentionally lost 22 pounds over the last 10 to 11 days due to not taking p.o. due to not feeling well.  Also states positive weakness, fatigue, appetite loss.  Says that over Memorial Day weekend his wife said that he had fevers, he also had chills and he also had a respiratory flu.  3) HEENT: Negative.  4) Cardiac: No edema.  Says he gets chest pain at times.  5) Pulmonary: No asthma, sob.  Says he wheezes sometimes.  6) GI: As per HPI.   7) Hematologic: No known personal or family history of bleeding diathesis.  8) Endocrine: No diabetes or thyroid disorders.  9) : No dysuria, hematuria, or frequency.  10) Musculoskeletal: No acute muscle/bone/joint pain/stiffness/swelling.  11) Neuro: No history of seizures or strokes.  12) Psych: Positive anxiety and says he also gets depressed when he is sick.        Physical Exam  1) VS-noted as documented.  2) General-laying in bed in no acute distress. Not septic appearing. Pleasant and cooperative. Looks fine and comfortable.   3) Neuro-Awake, alert, oriented to person, place, and time. Speech is normal. Affect is normal. Follows commands appropriately.  4) HEENT-Head normocephalic and externally atraumatic. Conjunctiva pink. Sclera anicteric. MMM.  5) Heart-RRR. SV rhythm on the monitor  Blood pressure on the monitor 164/98  6) Lungs-Clear. Speaks in complete sentences and does not have any accessory muscle use.  7)  Extremities-No edema. The patient's extremities are warm and normal color.  8) Skin-Warm and dry. No diaphoresis or jaundice.  9) Psych-Normal mood. Appropriate affect.   10) Abdomen-Soft. Positive bowel sounds. Non distended. Tenderness:  Left lower 0  Left upper 0  Epigastric 3  Periumbilical 2  Suprapubic 0  Right lower 0  Right upper 0  No guarding/rebound  No obvious abdominal or bilateral inguinal hernias  No abdominal pain when I shake his abdomen a little bit    Vital signs in last 24 hours:  Temp:  [35.9 °C (96.6 °F)-37.4 °C (99.3 °F)] 37.4 °C (99.3 °F)  Heart Rate:  [65-72] 72  Resp:  [11-26] 18  BP: (116-176)/() 150/97  Heart Rate:  [65-72]   Temp:  [35.9 °C (96.6 °F)-37.4 °C (99.3 °F)]   Resp:  [11-26]   BP: (116-176)/()   SpO2:  [94 %-97 %]      Intake/Output last 3 Shifts:  No intake/output data recorded.    Scheduled medications  Scheduled Medications[2]  Continuous medications  Continuous Medications[3]  PRN medications  PRN Medications[4]    Relevant Results  Results from last 7 days   Lab Units 06/02/25 0441 06/01/25 1622 05/28/25  1028   WBC AUTO x10*3/uL 10.0 13.0* 13.8*   HEMOGLOBIN g/dL 14.1 15.4 15.4   HEMATOCRIT % 40.5* 44.0 45.4   PLATELETS AUTO x10*3/uL 345 362 341      Results from last 7 days   Lab Units 06/02/25 0441 06/01/25  1622 05/28/25  1028   SODIUM mmol/L 136 135* 137   POTASSIUM mmol/L 3.6 3.9 4.4   CHLORIDE mmol/L 102 100 104   CO2 mmol/L 24 23 22   BUN mg/dL 13 21 27*   CREATININE mg/dL 0.85 0.95 1.23   GLUCOSE mg/dL 98 123* 122*   CALCIUM mg/dL 9.0 9.5 9.3       No results found for the last 7 days.    Imaging  CT abdomen pelvis w IV and oral contrast  Result Date: 6/1/2025  1. No definite acute abdominal or pelvic process. 2. Mild nonspecific wall thickening of the rectum which could be secondary to underdistention or mild proctitis. 3. Grade 1 anterolisthesis of L5 on S1 measuring 1.2 cm. Chronic L5 pars interarticularis defects. 4. Limited evaluation due to  "motion artifact and discontiguous scanning.   Signed by: Mack Wilson 6/1/2025 6:58 PM Dictation workstation:   [id number]           Assessment/Plan   Assessment & Plan      Epigastric abdominal pain  ? Etiology  Abdominal pain is resolved  Labs, imaging, and today's EGD as above  No acute surgical indications identified at present  Will sign off and see the patient in the future upon request  Okay to go back to [facility]  Thank you for this consult  Discussed with Dr Qing Elmore, APRN-CNP             Vital signs in last 24 hours:  Temp:  [35.9 °C (96.6 °F)-37.4 °C (99.3 °F)] 37.4 °C (99.3 °F)  Heart Rate:  [] 72  Resp:  [11-26] 18  BP: (116-195)/() 176/105  Heart Rate:  []   Temp:  [35.9 °C (96.6 °F)-37.4 °C (99.3 °F)]   Resp:  [11-26]   BP: (116-195)/()   Height:  [175.3 cm (5' 9\")]   Weight:  [83 kg (183 lb)]   SpO2:  [94 %-97 %]      Intake/Output last 3 Shifts:  No intake/output data recorded.    Physical Exam    Scheduled medications  Scheduled Medications[5]  Continuous medications  Continuous Medications[6]  PRN medications  PRN Medications[7]    Relevant Results  Results from last 7 days   Lab Units 06/02/25 0441 06/01/25 1622 05/28/25  1028   WBC AUTO x10*3/uL 10.0 13.0* 13.8*   HEMOGLOBIN g/dL 14.1 15.4 15.4   HEMATOCRIT % 40.5* 44.0 45.4   PLATELETS AUTO x10*3/uL 345 362 341      Results from last 7 days   Lab Units 06/02/25 0441 06/01/25 1622 05/28/25  1028   SODIUM mmol/L 136 135* 137   POTASSIUM mmol/L 3.6 3.9 4.4   CHLORIDE mmol/L 102 100 104   CO2 mmol/L 24 23 22   BUN mg/dL 13 21 27*   CREATININE mg/dL 0.85 0.95 1.23   GLUCOSE mg/dL 98 123* 122*   CALCIUM mg/dL 9.0 9.5 9.3      Imaging  CT abdomen pelvis w IV and oral contrast  Result Date: 6/1/2025  1. No definite acute abdominal or pelvic process. 2. Mild nonspecific wall thickening of the rectum which could be secondary to underdistention or mild proctitis. 3. Grade 1 anterolisthesis of L5 on " S1 measuring 1.2 cm. Chronic L5 pars interarticularis defects. 4. Limited evaluation due to motion artifact and discontiguous scanning.   Signed by: Mack Wilson 6/1/2025 6:58 PM Dictation workstation:   [id number]             Assessment/Plan   Assessment & Plan  Nausea and vomiting, unspecified vomiting type    Nausea and vomiting    Abdominal pain          Mercedez Elmore, APRN-CNP               [1]   Allergies  Allergen Reactions    Morphine Nausea/vomiting     Nausea and vomiting   [2] [3] [4] [5] dicyclomine, 10 mg, oral, 4x daily  famotidine, 20 mg, oral, BID   Or  famotidine, 20 mg, intravenous, BID  heparin (porcine), 5,000 Units, subcutaneous, q8h  pantoprazole, 40 mg, intravenous, BID  [START ON 6/3/2025] sertraline, 25 mg, oral, Daily  [6] [ - Suspended Admission] lactated Ringer's, 125 mL/hr, Last Rate: 125 mL/hr (06/02/25 1135)  [7] PRN medications: acetaminophen **OR** acetaminophen **OR** acetaminophen, benzocaine-menthol, bisacodyl, bisacodyl, droperidol, ondansetron ODT **OR** ondansetron, polyethylene glycol, prochlorperazine **OR** prochlorperazine **OR** prochlorperazine

## 2025-06-02 NOTE — CONSULTS
"Inpatient consult to Psychiatry  Consult performed by: PRANEETH Lott-CNP  Consult ordered by: Leon Perez MD  Reason for consult: \"acute anxiety in the setting of history of, also may have somatization d/o. would low dose ssri be indicated for patient?\"      PSYCHIATRY CONSULT NOTE      Visit type: Face to face evaluation       HISTORY OF PRESENT ILLNESS:     Levy Flores is a 60 y.o. male with a past psychiatric history of anxiety. Per chart review, patient has a past medical history of nausea, vomiting, abdominal pain, upper GI bleed, GERD, chronic diverticulitis with remote colon resection, status post cholecystectomy, somatization disorder presents to ED due to ongoing abdominal pain, intractable nausea, vomiting and diarrhea.     Patient’s chart was reviewed and the case discussed with nursing staff. On initial attempt seeing patient, nursing reported that patient had been transferred to [facility] for an EGD procedure. Following the procedure, patient returned to the facility and was evaluated in person. During the evaluation, patient was alert and cooperative. 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 He described his “belly problems,” stating that when he becomes ill, he typically experiences episodes of vomiting lasting 8 to 10 days. He estimated that he has spent approximately 10% of the current year managing these symptoms. Patient reported sleeping approximately 6 to 7 hours per night and described his appetite as good. He also stated that he exercises five days a week and considers himself physically active. He denied any current suicidal or homicidal ideation, as well as auditory or visual hallucinations. He reported a history of taking medication (\"long time ago\") for anxiety for about one year, " "after which he discontinued treatment. Patient acknowledged that he uses marijuana daily to manage his anxiety. He was educated about the risks and potential adverse interactions between marijuana and antidepressant medications, particularly SSRIs. Patient verbalized understanding of the information provided.    A plan to initiate Sertraline 25 mg daily was discussed and agreed upon. Patient was also encouraged to follow up with a psychiatrist and engage in counseling services. He expressed willingness to pursue both. The  was informed and will be providing patient with appropriate mental health resources.      Past Psychiatric History  Current/Previous Diagnoses: anxiety  Current Psychiatrist/Provider: Denies  Current Therapist: Denies  Other Providers / Agencies: Denies  Outpatient Treatment History: Denies  Past Medication Trials: Paxil, Remeron, Klonopin  Inpatient Hospitalizations: Denies  Suicide Attempts: Denies  Homicide attempts/Violence: Denies  Self Harm/Self Injurious: Denies    Substance Abuse History  Tobacco use history: Denies  Alcohol use history: Denies, quit 10 years ago  Cannabis use history: \"yes, daily use, I don't use a lot\"\"  Illicit Drug Use History: Denies    Social History  He reports that he has never smoked. He has never used smokeless tobacco. He reports that he does not drink alcohol and does not use drugs.  Household: lives \"family\"  Legal hx: \"DUI long time ago.\"  History of trauma/abuse: Denies  Weapons at home and access to lethal means: Denies    OARRS REVIEW  OARRS checked: yes  OARRS comments: score 000    Past Medical History  He has a past medical history of Abdominal pain (05/27/2025), Acute kidney injury (05/27/2025), Diverticulitis of colon (03/27/2014), Elevated blood pressure reading (05/27/2025), Gastro-esophageal reflux disease without esophagitis (06/01/2025), Gouty arthritis of right ankle (06/01/2025), Nausea and vomiting (05/27/2025), and Somatization " "disorder (05/27/2025).      ALLERGIES  Morphine    Surgical History  He has no past surgical history on file.    FAMILY HISTORY  Family History[1]   Family Psychiatric History: patient denies      PSYCHIATRIC REVIEW OF SYSTEMS  Depression: markedly diminished interest or pleasure in all or most activities due to \"belly issues\"  Anxiety: restlessness or feeling keyed up or on edge, difficulty concentrating, irritability, and sleep disturbance  Rosangela: negative  Psychosis: negative  Delirium: negative   Trauma: negative    OBJECTIVE:     VITALS      5/28/2025    12:00 PM 5/28/2025    12:07 PM 6/1/2025     4:05 PM 6/1/2025     8:22 PM 6/1/2025    11:59 PM 6/2/2025     7:25 AM 6/2/2025     8:48 AM   Vitals   Systolic   151 179 152 195 184   Diastolic   111 95 90 103 100   BP Location   Left arm Left arm Left arm Left arm Left arm   Heart Rate 114 99 100 123 67 81    Temp   37.1 °C (98.8 °F) 37 °C (98.6 °F) 37.1 °C (98.8 °F) 37.1 °C (98.8 °F)    Resp 23 17 18 20 18 20    Height   1.753 m (5' 9\") 1.753 m (5' 9\")      Weight (lb)   178 183      BMI   26.29 kg/m2 27.02 kg/m2      BSA (m2)   1.98 m2 2.01 m2         Body mass index is 27.02 kg/m².  Facility age limit for growth %lev is 20 years.  Wt Readings from Last 4 Encounters:   06/01/25 83 kg (183 lb)   05/28/25 86.2 kg (190 lb)   05/26/25 86.2 kg (190 lb)   02/11/25 85.2 kg (187 lb 13.3 oz)       Mental Status Exam  General: 60 years old male patient, seated comfortably during interview.  Appearance: Appeared as age stated; appropriately dressed/groomed.  Attitude: cooperative  Behavior: Fair EC; overall responding appropriately  Motor Activity: No notable sheela PMAR  Speech: Clear, with fair phonation, and no lisp nor dysarthria.   Mood: \"ok\"  Affect: Congruent and appropriate, with no signs of distress during the encounter  Thought Process: Linear and logical; not perseverating   Thought Content: Denied SI/HI. Not voicing/endorsing delusions.  Thought Perception: Did " not appear to be responding to internal stimuli. Not endorsing AVH  Cognition: Grossly intact; A&O x4/4 to self, place, date, and context.  Insight:  Fair to good, patient demonstrates an understanding of his condition and the need for treatment, though continued education and monitoring are warranted, particularly regarding substance use.  Judgement: Fair, compliant with medications in the hospital    CURRENT MEDICATIONS  Scheduled medications  Scheduled Medications[2]    Continuous medications  Continuous Medications[3]    PRN medications  PRN Medications[4]     LABS  Admission on 06/01/2025   Component Date Value Ref Range Status    WBC 06/01/2025 13.0 (H)  4.4 - 11.3 x10*3/uL Final    nRBC 06/01/2025 0.0  0.0 - 0.0 /100 WBCs Final    RBC 06/01/2025 5.35  4.50 - 5.90 x10*6/uL Final    Hemoglobin 06/01/2025 15.4  13.5 - 17.5 g/dL Final    Hematocrit 06/01/2025 44.0  41.0 - 52.0 % Final    MCV 06/01/2025 82  80 - 100 fL Final    MCH 06/01/2025 28.8  26.0 - 34.0 pg Final    MCHC 06/01/2025 35.0  32.0 - 36.0 g/dL Final    RDW 06/01/2025 11.8  11.5 - 14.5 % Final    Platelets 06/01/2025 362  150 - 450 x10*3/uL Final    Neutrophils % 06/01/2025 63.8  40.0 - 80.0 % Final    Immature Granulocytes %, Automated 06/01/2025 0.5  0.0 - 0.9 % Final    Immature Granulocyte Count (IG) includes promyelocytes, myelocytes and metamyelocytes but does not include bands. Percent differential counts (%) should be interpreted in the context of the absolute cell counts (cells/UL).    Lymphocytes % 06/01/2025 24.7  13.0 - 44.0 % Final    Monocytes % 06/01/2025 9.8  2.0 - 10.0 % Final    Eosinophils % 06/01/2025 0.8  0.0 - 6.0 % Final    Basophils % 06/01/2025 0.4  0.0 - 2.0 % Final    Neutrophils Absolute 06/01/2025 8.32 (H)  1.20 - 7.70 x10*3/uL Final    Percent differential counts (%) should be interpreted in the context of the absolute cell counts (cells/uL).    Immature Granulocytes Absolute, Au* 06/01/2025 0.07  0.00 - 0.70 x10*3/uL  Final    Lymphocytes Absolute 06/01/2025 3.21  1.20 - 4.80 x10*3/uL Final    Monocytes Absolute 06/01/2025 1.27 (H)  0.10 - 1.00 x10*3/uL Final    Eosinophils Absolute 06/01/2025 0.10  0.00 - 0.70 x10*3/uL Final    Basophils Absolute 06/01/2025 0.05  0.00 - 0.10 x10*3/uL Final    Glucose 06/01/2025 123 (H)  74 - 99 mg/dL Final    Sodium 06/01/2025 135 (L)  136 - 145 mmol/L Final    Potassium 06/01/2025 3.9  3.5 - 5.3 mmol/L Final    Chloride 06/01/2025 100  98 - 107 mmol/L Final    Bicarbonate 06/01/2025 23  21 - 32 mmol/L Final    Anion Gap 06/01/2025 16  10 - 20 mmol/L Final    Urea Nitrogen 06/01/2025 21  6 - 23 mg/dL Final    Creatinine 06/01/2025 0.95  0.50 - 1.30 mg/dL Final    eGFR 06/01/2025 >90  >60 mL/min/1.73m*2 Final    Calculations of estimated GFR are performed using the 2021 CKD-EPI Study Refit equation without the race variable for the IDMS-Traceable creatinine methods.  https://jasn.asnjournals.org/content/early/2021/09/22/ASN.6983677900    Calcium 06/01/2025 9.5  8.6 - 10.3 mg/dL Final    Albumin 06/01/2025 4.8  3.4 - 5.0 g/dL Final    Alkaline Phosphatase 06/01/2025 41  33 - 136 U/L Final    Total Protein 06/01/2025 7.7  6.4 - 8.2 g/dL Final    AST 06/01/2025 41 (H)  9 - 39 U/L Final    Bilirubin, Total 06/01/2025 1.6 (H)  0.0 - 1.2 mg/dL Final    ALT 06/01/2025 97 (H)  10 - 52 U/L Final    Patients treated with Sulfasalazine may generate falsely decreased results for ALT.    Lipase 06/01/2025 38  9 - 82 U/L Final    Lactate 06/01/2025 1.5  0.4 - 2.0 mmol/L Final    Color, Urine 06/01/2025 Yellow  Light-Yellow, Yellow, Dark-Yellow Final    Appearance, Urine 06/01/2025 Clear  Clear Final    Specific Gravity, Urine 06/01/2025 1.034  1.005 - 1.035 Final    pH, Urine 06/01/2025 6.0  5.0, 5.5, 6.0, 6.5, 7.0, 7.5, 8.0 Final    Protein, Urine 06/01/2025 10 (TRACE)  NEGATIVE, 10 (TRACE), 20 (TRACE) mg/dL Final    Glucose, Urine 06/01/2025 Normal  Normal mg/dL Final    Blood, Urine 06/01/2025 NEGATIVE   NEGATIVE mg/dL Final    Ketones, Urine 06/01/2025 10 (1+) (A)  NEGATIVE mg/dL Final    Bilirubin, Urine 06/01/2025 NEGATIVE  NEGATIVE mg/dL Final    Urobilinogen, Urine 06/01/2025 3 (1+) (A)  Normal mg/dL Final    Some pigments and medications may cause a false positive urobilinogen.    Nitrite, Urine 06/01/2025 NEGATIVE  NEGATIVE Final    Leukocyte Esterase, Urine 06/01/2025 NEGATIVE  NEGATIVE Final    Amphetamine Screen, Urine 06/01/2025 Presumptive Negative  Presumptive Negative Final    CUTOFF LEVEL: 500 NG/ML   Cross-reactivity has been reported with high concentrations   of the following drugs: buproprion, chloroquine, chlorpromazine,   ephedrine, mephentermine, fenfluramine, phentermine,   phenylpropanolamine, pseudoephedrine, and propranolol.    Barbiturate Screen, Urine 06/01/2025 Presumptive Negative  Presumptive Negative Final    CUTOFF LEVEL: 200 NG/ML    Benzodiazepines Screen, Urine 06/01/2025 Presumptive Positive (A)  Presumptive Negative Final    CUTOFF LEVEL: 200 NG/ML    Cannabinoid Screen, Urine 06/01/2025 Presumptive Positive (A)  Presumptive Negative Final    CUTOFF LEVEL: 50 NG/ML    Cocaine Metabolite Screen, Urine 06/01/2025 Presumptive Negative  Presumptive Negative Final    CUTOFF LEVEL: 150 NG/ML    Fentanyl Screen, Urine 06/01/2025 Presumptive Negative  Presumptive Negative Final    CUTOFF LEVEL: 5 NG/ML    Opiate Screen, Urine 06/01/2025 Presumptive Positive (A)  Presumptive Negative Final    CUTOFF LEVEL: 300 NG/ML  The opiate screen does not detect fentanyl, meperidine, or   tramadol. Oxycodone is not consistently detected (refer to  Oxycodone Screen, Urine result).    Oxycodone Screen, Urine 06/01/2025 Presumptive Positive (A)  Presumptive Negative Final    CUTOFF LEVEL: 100 NG/ML  This test will accurately detect both oxycodone and oxymorphone.    PCP Screen, Urine 06/01/2025 Presumptive Negative  Presumptive Negative Final    CUTOFF LEVEL:  25 NG/ML  Cross-reactivity has been  reported with dextromethorphan.    Methadone Screen, Urine 06/01/2025 Presumptive Negative  Presumptive Negative Final    CUTOFF LEVEL: 150 NG/ML  The metabolite L-alpha-acetylmethadol (LAAM) is not  detected by this method in concentrations that would  be found in the urine of patients on LAAM therapy.    Ventricular Rate 06/01/2025 82  BPM Preliminary    Atrial Rate 06/01/2025 82  BPM Preliminary    LA Interval 06/01/2025 144  ms Preliminary    QRS Duration 06/01/2025 88  ms Preliminary    QT Interval 06/01/2025 392  ms Preliminary    QTC Calculation(Bazett) 06/01/2025 457  ms Preliminary    P Axis 06/01/2025 25  degrees Preliminary    R Axis 06/01/2025 21  degrees Preliminary    T Axis 06/01/2025 45  degrees Preliminary    QRS Count 06/01/2025 14  beats Preliminary    Q Onset 06/01/2025 223  ms Preliminary    P Onset 06/01/2025 151  ms Preliminary    P Offset 06/01/2025 216  ms Preliminary    T Offset 06/01/2025 419  ms Preliminary    QTC Fredericia 06/01/2025 435  ms Preliminary    WBC, Urine 06/01/2025 NONE  1-5, NONE /HPF Final    RBC, Urine 06/01/2025 NONE  NONE, 1-2, 3-5 /HPF Final    Mucus, Urine 06/01/2025 FEW  Reference range not established. /LPF Final    WBC 06/02/2025 10.0  4.4 - 11.3 x10*3/uL Final    nRBC 06/02/2025 0.0  0.0 - 0.0 /100 WBCs Final    RBC 06/02/2025 4.90  4.50 - 5.90 x10*6/uL Final    Hemoglobin 06/02/2025 14.1  13.5 - 17.5 g/dL Final    Hematocrit 06/02/2025 40.5 (L)  41.0 - 52.0 % Final    MCV 06/02/2025 83  80 - 100 fL Final    MCH 06/02/2025 28.8  26.0 - 34.0 pg Final    MCHC 06/02/2025 34.8  32.0 - 36.0 g/dL Final    RDW 06/02/2025 11.8  11.5 - 14.5 % Final    Platelets 06/02/2025 345  150 - 450 x10*3/uL Final    Glucose 06/02/2025 98  74 - 99 mg/dL Final    Sodium 06/02/2025 136  136 - 145 mmol/L Final    Potassium 06/02/2025 3.6  3.5 - 5.3 mmol/L Final    Chloride 06/02/2025 102  98 - 107 mmol/L Final    Bicarbonate 06/02/2025 24  21 - 32 mmol/L Final    Anion Gap 06/02/2025 14   10 - 20 mmol/L Final    Urea Nitrogen 06/02/2025 13  6 - 23 mg/dL Final    Creatinine 06/02/2025 0.85  0.50 - 1.30 mg/dL Final    eGFR 06/02/2025 >90  >60 mL/min/1.73m*2 Final    Calculations of estimated GFR are performed using the 2021 CKD-EPI Study Refit equation without the race variable for the IDMS-Traceable creatinine methods.  https://jasn.asnjournals.org/content/early/2021/09/22/ASN.1684742583    Calcium 06/02/2025 9.0  8.6 - 10.3 mg/dL Final    Albumin 06/02/2025 4.0  3.4 - 5.0 g/dL Final    Alkaline Phosphatase 06/02/2025 38  33 - 136 U/L Final    Total Protein 06/02/2025 6.6  6.4 - 8.2 g/dL Final    AST 06/02/2025 41 (H)  9 - 39 U/L Final    Bilirubin, Total 06/02/2025 1.6 (H)  0.0 - 1.2 mg/dL Final    ALT 06/02/2025 83 (H)  10 - 52 U/L Final    Patients treated with Sulfasalazine may generate falsely decreased results for ALT.    Ventricular Rate 06/02/2025 81  BPM Preliminary    Atrial Rate 06/02/2025 81  BPM Preliminary    VT Interval 06/02/2025 152  ms Preliminary    QRS Duration 06/02/2025 82  ms Preliminary    QT Interval 06/02/2025 392  ms Preliminary    QTC Calculation(Bazett) 06/02/2025 455  ms Preliminary    P Axis 06/02/2025 42  degrees Preliminary    R Axis 06/02/2025 -18  degrees Preliminary    T Axis 06/02/2025 29  degrees Preliminary    QRS Count 06/02/2025 13  beats Preliminary    Q Onset 06/02/2025 225  ms Preliminary    P Onset 06/02/2025 149  ms Preliminary    P Offset 06/02/2025 210  ms Preliminary    T Offset 06/02/2025 421  ms Preliminary    QTC Fredericia 06/02/2025 433  ms Preliminary   Pertinent labs were reviewed      ASSESSMENT:     PSYCHIATRIC RISK ASSESSMENT  Violence Risk Factors:  male and current psychiatric illness  Acute Risk of Harm to Others is Considered: Low  Suicide Risk Factors: male and   Protective Factors: fear of suicide or death, sense of responsibility towards family, positive family relationships, and hopefulness/future-orientation  Acute Risk of  Harm to Self is Considered: Low    DIAGNOSIS  Assessment & Plan  Nausea and vomiting, unspecified vomiting type    Nausea and vomiting    Abdominal pain        IMPRESSION  60-year-old male patient endorses anxiety related to chronic GI issues and occupational stress, for which he currently self-medicates with daily marijuana use. He is physically active and reports adequate sleep and appetite but has experienced recurrent episodes of vomiting and abdominal pain. He denied acute psychiatric symptoms, including suicidal or homicidal ideation. A plan to initiate Sertraline 25 mg daily was discussed and accepted, with education provided regarding potential interactions with marijuana. Patient was also receptive to psychiatric follow-up and counseling.     PLAN/ RECOMMENDATIONS:     -Start Sertraline 25 mg daily help with anxiety/depressive symptoms    -Social work to provide patient resources for outpatient  providers. Discuss with SW.    - Patient does not currently meet criteria for inpatient psychiatric admission.     Medication Consent  Medication Consent: risks, benefits, side effects reviewed for all ordered meds and patient/caregiver expressed understanding and consent obtained.    -Thank you for allowing us to participate in the care of this patient. Psychiatry will continue to follow while admitted.      I personally spent 76 minutes today providing care for this patient, including preparation, face to face time, documentation and other services such as review of medical records, diagnostic result, patient education, counseling, coordination of care as specified in the encounter.            [1]   Family History  Problem Relation Name Age of Onset    Cancer Mother      No Known Problems Father     [2] dicyclomine, 10 mg, oral, 4x daily  famotidine, 20 mg, oral, BID   Or  famotidine, 20 mg, intravenous, BID  heparin (porcine), 5,000 Units, subcutaneous, q8h  pantoprazole, 40 mg, intravenous, BID  [3] lactated  Ringer's, 125 mL/hr, Last Rate: 125 mL/hr (06/02/25 0517)  [4] PRN medications: acetaminophen **OR** acetaminophen **OR** acetaminophen, benzocaine-menthol, bisacodyl, bisacodyl, droperidol, ondansetron ODT **OR** ondansetron, polyethylene glycol, prochlorperazine **OR** prochlorperazine **OR** prochlorperazine

## 2025-06-02 NOTE — ASSESSMENT & PLAN NOTE
Patient complains of pain around his bellybutton  mild elevation of elevated AST and ALT  Give Bentyl 4 times a day  CT of the abdomen see above  CMP in a.m.

## 2025-06-02 NOTE — ANESTHESIA POSTPROCEDURE EVALUATION
Patient: Levy Flores    Procedure Summary       Date: 06/02/25 Room / Location: [facility]    Anesthesia Start: 1135 Anesthesia Stop: 1155    Procedure: EGD Diagnosis: Upper gastrointestinal hemorrhage    Scheduled Providers: Daryl Bhagat MD; Fernando Golden MD; HAY Han Responsible Provider: Fernando Golden MD    Anesthesia Type: MAC ASA Status: 3            Anesthesia Type: MAC    Vitals Value Taken Time   /93 06/02/25 13:01   Temp 35.9 °C (96.6 °F) 06/02/25 11:55   Pulse 75 06/02/25 13:04   Resp 10 06/02/25 13:04   SpO2 97 % 06/02/25 13:04   Vitals shown include unfiled device data.    Anesthesia Post Evaluation    Patient location during evaluation: PACU  Patient participation: complete - patient participated  Level of consciousness: awake  Pain management: adequate  Airway patency: patent  Cardiovascular status: acceptable  Respiratory status: acceptable  Hydration status: acceptable  Postoperative Nausea and Vomiting: none        There were no known notable events for this encounter.

## 2025-06-02 NOTE — CONSULTS
Nutrition Consult Note    Nutrition Assessment         Patient is a 60 y.o. male presenting with ongoing abdominal discomfort, nausea and vomiting.  He was first seen on 5/26/2025 for the symptoms.  CT scan abdomen pelvis with IV contrast was negative, he was admitted for mild DELFINO.  Symptoms improve with IV fluids and he was tolerating diet and hospitalist was discharged home.  He presented 2 days ago on 5/28/2025 with similar symptoms.  Treated emergency department with IV medications with improvement of discharged home.  Patient does have an appoint with his gastroenterologist on Tuesday.  He has been having persistent nausea and vomiting abdominal pain.  He cannot eat or drink at home.  He does have a history of colectomy 17 years ago secondary to perforation.   History of upper GI bleed, GERD, chronic diverticulitis with remote colon resection, status post cholecystectomy, somatization disorder.    Patient had CT completed with no acute findings.  Plan for EGD, patient is NPO.  GI to consider possible flex sigmoidoscopy if needed.      Nutrition History:  Energy Intake: Poor < 50 %  Pain affecting nutrition status: Yes  Nursing screening consult per MST score (3), patient stated they had lost 22 pounds in 10 days completed by remote RD assessment.    Patient lives at home with his wife and reports being unable to eat/drink anything for greater than 3 weeks due to persistent and intense abdominal pain with vomiting/emesis.  He has estimated a weight loss of 22 pounds in the last 2-3 weeks however per chart review patients weight is down 4.2 5 months.  He has been unable to eat with any consistency over the last few weeks, has tolerated  some clear liquids (jello) and items such as puddings however in minimal amounts.  He has worked to stay as hydrated as possible however this has been challenging due to persistent vomiting.  Reviewed with patient concern with involuntary weight loss in the last  5-6 months and  "noted loss of strength (and likely lean muscle mass during this time).  He is currently NPO, agreeable to having Gelatein plus with meals TID.    Anthropometrics:  Height: 175.3 cm (5' 9\")   Weight: 83 kg (183 lb)   BMI (Calculated): 27.01    Weight History:   Wt Readings from Last 10 Encounters:   06/01/25 83 kg (183 lb)   05/28/25 86.2 kg (190 lb)   05/26/25 86.2 kg (190 lb)   02/11/25 85.2 kg (187 lb 13.3 oz)   02/10/25 87.2 kg (192 lb 3.9 oz)   06/17/24 80.4 kg (177 lb 4 oz)   03/18/24 91.6 kg (202 lb)   01/31/24 91 kg (200 lb 9.6 oz)   01/14/22 82.6 kg (182 lb)   08/24/21 82.6 kg (182 lb)   .    Weight Change %:  Weight History / % Weight Change: Patient has lost 5 percent of his weight in the last 4-5 months  Significant Weight Loss: No    Nutrition Focused Physical Exam Findings:  Unable to complete due to remote RD assessment.     Nutrition Significant Labs:  BMP Trend:   Results from last 7 days   Lab Units 06/02/25  0441 06/01/25  1622 05/28/25  1028 05/27/25  0452   GLUCOSE mg/dL 98 123* 122* 126*   CALCIUM mg/dL 9.0 9.5 9.3 9.3   SODIUM mmol/L 136 135* 137 140   POTASSIUM mmol/L 3.6 3.9 4.4 4.9   CO2 mmol/L 24 23 22 15*   CHLORIDE mmol/L 102 100 104 110*   BUN mg/dL 13 21 27* 36*   CREATININE mg/dL 0.85 0.95 1.23 1.43*    , Vit D: No results found for: \"VITD25\" , Vit B12: No results found for: \"FDRQKVEY63\"     Nutrition Specific Medications:  LR 125mL/hour  Zofran 4mg every 8 hours (PRN)  Compazine tablet/injection/suppository    I/O:    ;      Dietary Orders (From admission, onward)       Start     Ordered    06/02/25 0001  [Order Hold - Suspended Admission]  NPO Diet; Effective midnight  Diet effective midnight   (On hold at [facility] since 06/02/25 1044)    06/01/25 1941    06/01/25 2217  [Order Hold - Suspended Admission]  May Participate in Room Service  ( ROOM SERVICE MAY PARTICIPATE)  Once   (On hold at [address] since 06/02/25 1044)   Question:  " .  Answer:  Yes    06/01/25 2216                     Estimated Needs:   Total Energy Estimated Needs in 24 hours (kCal): 1909 kCal  Method for Estimating Needs: ABW  Total Protein Estimated Needs in 24 Hours (g): 100 g  Method for Estimating 24 Hour Protein Needs: ABW    Nutrition Diagnosis   Malnutrition Diagnosis  Patient has Malnutrition Diagnosis: No    Patient is at risk of malnutrition however unable to quantify data at this time.    Nutrition Diagnosis  Patient has Nutrition Diagnosis: Yes  Diagnosis Status (1): New  Nutrition Diagnosis 1: Inadequate energy intake  Related to (1): to persistant nasuea/vomiting, abomidnal pain  As Evidenced by (1): patient report, intake history and involuntary weight loss of 5% in the last 5 months.  Additional Assessment Information (1): Patient is at risk for malnutrition however unable to quantify data at this time.       Nutrition Interventions/Recommendations   Nutrition prescription for oral nutrition    Nutrition Recommendations:  Individualized Nutrition Prescription Provided for : Recommend diet advancement per physician with Gelatein Plus TID with meals ( 160 calories/20grams protein each)    Nutrition Interventions/Goals:   Meals and Snacks: General healthful diet  Goal: Diet advancement with intake of greater than 50% of meals TID by RD followup      Nutrition Monitoring and Evaluation   Food/Nutrient Related History Monitoring  Monitoring and Evaluation Plan: Intake / amount of food  Intake / Amount of food: Consumes at least 50% or more of meals/snacks/supplements    Physical Exam Findings  Monitoring and Evaluation Plan: Digestive System  Digestive System Finding: Abdominal pain, Nausea, Vomiting    Goal Status: New goal(s) identified    Time Spent (min): 60 minutes    Time Spent (min): 60 minutes  Last Date of Nutrition Visit: 06/02/25  Nutrition Follow-Up Needed?: Dietitian to reassess per policy  Follow up Comment: NPO/ONS ordered with diet  advancement    06/02/25 at 11:13 AM - TERRIE MAYBERRY RDN, LD

## 2025-06-03 DIAGNOSIS — N17.9 AKI (ACUTE KIDNEY INJURY): ICD-10-CM

## 2025-06-03 NOTE — DISCHARGE SUMMARY
Discharge Diagnosis  Nausea and vomiting, unspecified vomiting type           Issues Requiring Follow-Up  Primary care follow-up    Discharge Meds     Medication List      START taking these medications     pantoprazole 40 mg EC tablet; Commonly known as: ProtoNix; Take 1 tablet   (40 mg) by mouth 2 times a day. Do not crush, chew, or split.   sertraline 25 mg tablet; Commonly known as: Zoloft; Take 1 tablet (25   mg) by mouth once daily. Do not fill before Vira 3, 2025.     CONTINUE taking these medications     dicyclomine 20 mg tablet; Commonly known as: Bentyl; Take 1 tablet (20   mg) by mouth 4 times a day before meals.     STOP taking these medications     omeprazole 20 mg DR capsule; Commonly known as: PriLOSEC       Test Results Pending At Discharge  Pending Labs       Order Current Status    Extra Urine Gray Tube Collected (06/01/25 1815)    Urinalysis with Reflex Culture and Microscopic In process            Hospital Course   Presented to hospital with dyspepsia, nausea and vomiting for several days.  Patient also had atypical chest discomfort during hospital stay as well.  GI was consulted for the dyspepsia and nausea vomiting.  EGD was done which showed mild esophagitis.  EKG and troponins were done in hospital which were normal.  Discussion with the patient regarding his medical history revealed a history of anxiety in the past that he was managed with medication for by psychiatry.  He does admit to ongoing stressors and it seems it is more generalized than anything.  I did consult psychiatry who recommended starting on Zoloft 25 mg daily.  Patient was tolerating full diet prior to discharge.  Patient will continue on Zoloft, will continue with pantoprazole 40 mg twice daily for a month and will follow-up with his primary care physician.  Otherwise hospital stay was uneventful and patient was discharged in stable improved condition.    Pertinent Physical Exam At Time of Discharge  Physical  Exam  Generally no acute distress  HEENT PERRLA EOMI  Cardiovascular S1-S2 regular rate rhythm  Lungs clear to auscultation bilaterally  Abdomen nontender bowel sounds present  Outpatient Follow-Up  No future appointments.  Total time spent in discharge was 40 minutes    Leon Perez MD

## 2025-06-05 ENCOUNTER — TELEPHONE (OUTPATIENT)
Dept: INPATIENT UNIT | Facility: HOSPITAL | Age: 60
End: 2025-06-05
Payer: COMMERCIAL

## 2025-06-11 LAB
LABORATORY COMMENT REPORT: NORMAL
PATH REPORT.FINAL DX SPEC: NORMAL
PATH REPORT.GROSS SPEC: NORMAL
PATH REPORT.RELEVANT HX SPEC: NORMAL
PATH REPORT.TOTAL CANCER: NORMAL